# Patient Record
Sex: FEMALE | Race: WHITE | NOT HISPANIC OR LATINO | Employment: FULL TIME | URBAN - METROPOLITAN AREA
[De-identification: names, ages, dates, MRNs, and addresses within clinical notes are randomized per-mention and may not be internally consistent; named-entity substitution may affect disease eponyms.]

---

## 2018-02-02 ENCOUNTER — RECORDS - HEALTHEAST (OUTPATIENT)
Dept: LAB | Facility: CLINIC | Age: 53
End: 2018-02-02

## 2018-02-03 LAB — BACTERIA SPEC CULT: NO GROWTH

## 2018-07-23 ENCOUNTER — RECORDS - HEALTHEAST (OUTPATIENT)
Dept: LAB | Facility: CLINIC | Age: 53
End: 2018-07-23

## 2018-07-23 LAB
C DIFF TOX B STL QL: NEGATIVE
RIBOTYPE 027/NAP1/BI: NORMAL

## 2018-10-15 ENCOUNTER — RECORDS - HEALTHEAST (OUTPATIENT)
Dept: LAB | Facility: CLINIC | Age: 53
End: 2018-10-15

## 2018-10-16 LAB — BACTERIA SPEC CULT: NO GROWTH

## 2018-11-28 ENCOUNTER — RECORDS - HEALTHEAST (OUTPATIENT)
Dept: LAB | Facility: CLINIC | Age: 53
End: 2018-11-28

## 2018-11-29 LAB
ALBUMIN SERPL-MCNC: 4 G/DL (ref 3.5–5)
ANION GAP SERPL CALCULATED.3IONS-SCNC: 9 MMOL/L (ref 5–18)
BUN SERPL-MCNC: 16 MG/DL (ref 8–22)
CALCIUM SERPL-MCNC: 9.8 MG/DL (ref 8.5–10.5)
CHLORIDE BLD-SCNC: 109 MMOL/L (ref 98–107)
CO2 SERPL-SCNC: 25 MMOL/L (ref 22–31)
CREAT SERPL-MCNC: 0.73 MG/DL (ref 0.6–1.1)
GFR SERPL CREATININE-BSD FRML MDRD: >60 ML/MIN/1.73M2
GLUCOSE BLD-MCNC: 94 MG/DL (ref 70–125)
PHOSPHATE SERPL-MCNC: 3.4 MG/DL (ref 2.5–4.5)
POTASSIUM BLD-SCNC: 3.5 MMOL/L (ref 3.5–5)
SODIUM SERPL-SCNC: 143 MMOL/L (ref 136–145)
TSH SERPL DL<=0.005 MIU/L-ACNC: 4.1 UIU/ML (ref 0.3–5)

## 2019-02-20 ENCOUNTER — AMBULATORY - HEALTHEAST (OUTPATIENT)
Dept: CARDIOLOGY | Facility: CLINIC | Age: 54
End: 2019-02-20

## 2019-02-20 DIAGNOSIS — R07.9 CHEST PAIN: ICD-10-CM

## 2019-02-25 ENCOUNTER — HOSPITAL ENCOUNTER (OUTPATIENT)
Dept: CARDIOLOGY | Facility: CLINIC | Age: 54
Discharge: HOME | End: 2019-02-25
Attending: INTERNAL MEDICINE

## 2019-02-25 DIAGNOSIS — R07.9 CHEST PAIN: ICD-10-CM

## 2019-02-25 LAB
CV STRESS CURRENT BP HE: NORMAL
CV STRESS CURRENT HR HE: 101
CV STRESS CURRENT HR HE: 104
CV STRESS CURRENT HR HE: 107
CV STRESS CURRENT HR HE: 109
CV STRESS CURRENT HR HE: 109
CV STRESS CURRENT HR HE: 111
CV STRESS CURRENT HR HE: 111
CV STRESS CURRENT HR HE: 113
CV STRESS CURRENT HR HE: 124
CV STRESS CURRENT HR HE: 127
CV STRESS CURRENT HR HE: 127
CV STRESS CURRENT HR HE: 129
CV STRESS CURRENT HR HE: 131
CV STRESS CURRENT HR HE: 135
CV STRESS CURRENT HR HE: 137
CV STRESS CURRENT HR HE: 138
CV STRESS CURRENT HR HE: 141
CV STRESS CURRENT HR HE: 141
CV STRESS CURRENT HR HE: 144
CV STRESS CURRENT HR HE: 159
CV STRESS CURRENT HR HE: 159
CV STRESS CURRENT HR HE: 92
CV STRESS CURRENT HR HE: 96
CV STRESS CURRENT HR HE: 97
CV STRESS CURRENT HR HE: 98
CV STRESS CURRENT HR HE: 99
CV STRESS DEVIATION TIME HE: NORMAL
CV STRESS ECHO PERCENT HR HE: NORMAL
CV STRESS EXERCISE STAGE HE: NORMAL
CV STRESS FINAL RESTING BP HE: NORMAL
CV STRESS FINAL RESTING HR HE: 97
CV STRESS MAX HR HE: 160
CV STRESS MAX TREADMILL GRADE HE: 14
CV STRESS MAX TREADMILL SPEED HE: 3.4
CV STRESS PEAK DIA BP HE: NORMAL
CV STRESS PEAK SYS BP HE: NORMAL
CV STRESS PHASE HE: NORMAL
CV STRESS PROTOCOL HE: NORMAL
CV STRESS RESTING PT POSITION HE: NORMAL
CV STRESS ST DEVIATION AMOUNT HE: NORMAL
CV STRESS ST DEVIATION ELEVATION HE: NORMAL
CV STRESS ST EVELATION AMOUNT HE: NORMAL
CV STRESS TEST TYPE HE: NORMAL
CV STRESS TOTAL STAGE TIME MIN 1 HE: NORMAL
STRESS ECHO BASELINE BP: NORMAL
STRESS ECHO BASELINE HR: 82
STRESS ECHO CALCULATED PERCENT HR: 95 %
STRESS ECHO LAST STRESS BP: NORMAL
STRESS ECHO LAST STRESS HR: 159
STRESS ECHO POST ESTIMATED WORKLOAD: 10.1
STRESS ECHO POST EXERCISE DUR MIN: 8
STRESS ECHO POST EXERCISE DUR SEC: 26
STRESS ECHO TARGET HR: 144

## 2019-03-29 ENCOUNTER — RECORDS - HEALTHEAST (OUTPATIENT)
Dept: LAB | Facility: HOSPITAL | Age: 54
End: 2019-03-29

## 2019-03-29 ENCOUNTER — OFFICE VISIT - HEALTHEAST (OUTPATIENT)
Dept: CARDIOLOGY | Facility: CLINIC | Age: 54
End: 2019-03-29

## 2019-03-29 DIAGNOSIS — R07.89 OTHER CHEST PAIN: ICD-10-CM

## 2019-03-29 LAB — LEVETIRACETAM (KEPPRA): 26.6 UG/ML (ref 6–46)

## 2019-03-29 RX ORDER — B-COMPLEX WITH VITAMIN C
1000 TABLET ORAL DAILY
Status: SHIPPED | COMMUNITY
Start: 2019-03-29

## 2019-03-29 RX ORDER — METOPROLOL SUCCINATE 25 MG/1
25 TABLET, EXTENDED RELEASE ORAL DAILY
Refills: 4 | Status: SHIPPED | COMMUNITY
Start: 2019-02-22

## 2019-03-29 ASSESSMENT — MIFFLIN-ST. JEOR: SCORE: 1352.95

## 2019-05-24 ENCOUNTER — RECORDS - HEALTHEAST (OUTPATIENT)
Dept: LAB | Facility: CLINIC | Age: 54
End: 2019-05-24

## 2019-05-25 LAB — BACTERIA SPEC CULT: NO GROWTH

## 2019-09-13 ENCOUNTER — RECORDS - HEALTHEAST (OUTPATIENT)
Dept: LAB | Facility: CLINIC | Age: 54
End: 2019-09-13

## 2019-09-13 LAB
T4 FREE SERPL-MCNC: 1 NG/DL (ref 0.7–1.8)
TSH SERPL DL<=0.005 MIU/L-ACNC: 2.17 UIU/ML (ref 0.3–5)

## 2019-10-11 ENCOUNTER — RECORDS - HEALTHEAST (OUTPATIENT)
Dept: LAB | Facility: CLINIC | Age: 54
End: 2019-10-11

## 2019-10-12 LAB — BACTERIA SPEC CULT: NO GROWTH

## 2020-01-10 ENCOUNTER — RECORDS - HEALTHEAST (OUTPATIENT)
Dept: LAB | Facility: CLINIC | Age: 55
End: 2020-01-10

## 2020-01-10 LAB
ALBUMIN SERPL-MCNC: 3.9 G/DL (ref 3.5–5)
ALP SERPL-CCNC: 103 U/L (ref 45–120)
ALT SERPL W P-5'-P-CCNC: 19 U/L (ref 0–45)
ANION GAP SERPL CALCULATED.3IONS-SCNC: 9 MMOL/L (ref 5–18)
AST SERPL W P-5'-P-CCNC: 15 U/L (ref 0–40)
BILIRUB SERPL-MCNC: 0.5 MG/DL (ref 0–1)
BUN SERPL-MCNC: 17 MG/DL (ref 8–22)
CALCIUM SERPL-MCNC: 9.8 MG/DL (ref 8.5–10.5)
CHLORIDE BLD-SCNC: 107 MMOL/L (ref 98–107)
CO2 SERPL-SCNC: 25 MMOL/L (ref 22–31)
CREAT SERPL-MCNC: 0.75 MG/DL (ref 0.6–1.1)
GFR SERPL CREATININE-BSD FRML MDRD: >60 ML/MIN/1.73M2
GLUCOSE BLD-MCNC: 84 MG/DL (ref 70–125)
POTASSIUM BLD-SCNC: 4.3 MMOL/L (ref 3.5–5)
PROT SERPL-MCNC: 6.8 G/DL (ref 6–8)
SODIUM SERPL-SCNC: 141 MMOL/L (ref 136–145)

## 2020-01-11 LAB — BACTERIA SPEC CULT: NO GROWTH

## 2021-01-15 ENCOUNTER — RECORDS - HEALTHEAST (OUTPATIENT)
Dept: LAB | Facility: CLINIC | Age: 56
End: 2021-01-15

## 2021-01-16 LAB — BACTERIA SPEC CULT: NO GROWTH

## 2021-01-25 ENCOUNTER — RECORDS - HEALTHEAST (OUTPATIENT)
Dept: LAB | Facility: CLINIC | Age: 56
End: 2021-01-25

## 2021-01-25 ENCOUNTER — HOSPITAL ENCOUNTER (OUTPATIENT)
Dept: LAB | Age: 56
Setting detail: SPECIMEN
Discharge: HOME | End: 2021-01-25

## 2021-01-25 LAB
ALBUMIN SERPL-MCNC: 3.9 G/DL (ref 3.5–5)
ALP SERPL-CCNC: 100 U/L (ref 45–120)
ALT SERPL W P-5'-P-CCNC: 19 U/L (ref 0–45)
ANION GAP SERPL CALCULATED.3IONS-SCNC: 9 MMOL/L (ref 5–18)
AST SERPL W P-5'-P-CCNC: 16 U/L (ref 0–40)
BILIRUB SERPL-MCNC: 0.5 MG/DL (ref 0–1)
BUN SERPL-MCNC: 12 MG/DL (ref 8–22)
CALCIUM SERPL-MCNC: 9.4 MG/DL (ref 8.5–10.5)
CHLORIDE BLD-SCNC: 109 MMOL/L (ref 98–107)
CO2 SERPL-SCNC: 22 MMOL/L (ref 22–31)
CREAT SERPL-MCNC: 0.78 MG/DL (ref 0.6–1.1)
GFR SERPL CREATININE-BSD FRML MDRD: >60 ML/MIN/1.73M2
GLUCOSE BLD-MCNC: 88 MG/DL (ref 70–125)
POTASSIUM BLD-SCNC: 3.9 MMOL/L (ref 3.5–5)
PROT SERPL-MCNC: 6.8 G/DL (ref 6–8)
SODIUM SERPL-SCNC: 140 MMOL/L (ref 136–145)

## 2021-01-28 ENCOUNTER — RECORDS - HEALTHEAST (OUTPATIENT)
Dept: SCHEDULING | Facility: CLINIC | Age: 56
End: 2021-01-28

## 2021-01-28 ENCOUNTER — RECORDS - HEALTHEAST (OUTPATIENT)
Dept: ADMINISTRATIVE | Facility: OTHER | Age: 56
End: 2021-01-28

## 2021-01-28 DIAGNOSIS — Z12.31 VISIT FOR SCREENING MAMMOGRAM: ICD-10-CM

## 2021-02-05 ENCOUNTER — RECORDS - HEALTHEAST (OUTPATIENT)
Dept: LAB | Facility: CLINIC | Age: 56
End: 2021-02-05

## 2021-02-05 LAB — URATE SERPL-MCNC: 3.1 MG/DL (ref 2–7.5)

## 2021-05-27 ENCOUNTER — RECORDS - HEALTHEAST (OUTPATIENT)
Dept: ADMINISTRATIVE | Facility: CLINIC | Age: 56
End: 2021-05-27

## 2021-05-28 ENCOUNTER — RECORDS - HEALTHEAST (OUTPATIENT)
Dept: ADMINISTRATIVE | Facility: CLINIC | Age: 56
End: 2021-05-28

## 2021-05-29 ENCOUNTER — RECORDS - HEALTHEAST (OUTPATIENT)
Dept: ADMINISTRATIVE | Facility: CLINIC | Age: 56
End: 2021-05-29

## 2021-05-30 ENCOUNTER — RECORDS - HEALTHEAST (OUTPATIENT)
Dept: ADMINISTRATIVE | Facility: CLINIC | Age: 56
End: 2021-05-30

## 2021-06-01 ENCOUNTER — RECORDS - HEALTHEAST (OUTPATIENT)
Dept: ADMINISTRATIVE | Facility: CLINIC | Age: 56
End: 2021-06-01

## 2021-06-02 VITALS — BODY MASS INDEX: 22.32 KG/M2 | HEIGHT: 69 IN | WEIGHT: 150.7 LBS

## 2021-06-16 PROBLEM — R07.9 CHEST PAIN: Status: ACTIVE | Noted: 2019-02-18

## 2021-06-16 PROBLEM — R03.0 ELEVATED BLOOD PRESSURE READING WITHOUT DIAGNOSIS OF HYPERTENSION: Status: ACTIVE | Noted: 2019-02-18

## 2021-06-16 PROBLEM — F12.10 MILD TETRAHYDROCANNABINOL (THC) ABUSE: Status: ACTIVE | Noted: 2019-02-18

## 2021-06-16 PROBLEM — F33.9 EPISODE OF RECURRENT MAJOR DEPRESSIVE DISORDER (H): Status: ACTIVE | Noted: 2019-02-18

## 2021-06-16 PROBLEM — R56.9 NEW ONSET SEIZURE (H): Status: ACTIVE | Noted: 2019-02-18

## 2021-06-16 PROBLEM — G40.409 TONIC-CLONIC SEIZURES (H): Status: ACTIVE | Noted: 2019-02-18
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
## 2021-07-21 ENCOUNTER — RECORDS - HEALTHEAST (OUTPATIENT)
Dept: ADMINISTRATIVE | Facility: CLINIC | Age: 56
End: 2021-07-21

## 2021-08-11 ENCOUNTER — OFFICE VISIT (OUTPATIENT)
Dept: NEUROLOGY | Facility: CLINIC | Age: 56
End: 2021-08-11
Payer: COMMERCIAL

## 2021-08-11 VITALS
HEART RATE: 69 BPM | DIASTOLIC BLOOD PRESSURE: 99 MMHG | HEIGHT: 69 IN | WEIGHT: 179 LBS | BODY MASS INDEX: 26.51 KG/M2 | SYSTOLIC BLOOD PRESSURE: 157 MMHG

## 2021-08-11 DIAGNOSIS — R56.9 SINGLE SEIZURE (H): Primary | ICD-10-CM

## 2021-08-11 PROCEDURE — 99215 OFFICE O/P EST HI 40 MIN: CPT | Performed by: PSYCHIATRY & NEUROLOGY

## 2021-08-11 RX ORDER — ESCITALOPRAM OXALATE 20 MG/1
20 TABLET ORAL DAILY
COMMUNITY
Start: 2020-09-12

## 2021-08-11 ASSESSMENT — MIFFLIN-ST. JEOR: SCORE: 1481.32

## 2021-08-11 NOTE — LETTER
8/11/2021         RE: Seble Cardona  [address] [facility] [id number]        Dear Colleague,    Thank you for referring your patient, Seble Cardona, to the [facility] [place]. Please see a copy of my visit note below.    In person evaluation    HPI  5/22/2019, in person evaluation  8/11/2021, in person evaluation    55-year-old followed neurologically for:  Single seizure February 18, 2019  Significant sleep deprivation ( had left her went through a divorce)  Now doing much better    Patient was initially treated with Keppra  500 mg twice daily  Level was around 26 we reduce the dose  Keppra 250 mg in the morning 500 mg at night  Still felt too tired  About 6 months or so ago she stopped the medication completely  No seizures    We discussed using low-dose medication maybe at a lower dose  Discussed the fact that she had a normal MRI and a normal EEG and a single event  She has chosen to be off of medication at this time  If she has a second spell then we would have to restart medication  She might be able to get by with 250 mg twice daily  Or we could try a different antiepileptic medication    Since last seen about 2 years ago  No hospitalizations  No surgeries  No major illnesses  Did not get Covid  Did get the Covid vaccine Moderna, had some yucky feeling with both injections    Does have difficulty with left rotator cuff with calcification is going to have surgery August 26, 2021 with a arthroscopic procedure.  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    Is no longer having difficulty with sleep or sleep deprivation    Past neurologic history  Patient with single seizure February 18, 2019  Had left arm focal shaking numbness on that side then developed generalized tonic-clonic seizure  There is no déjà vu or taste changes at the time  No past seizures  prior to this  No history of head injury or meningitis or encephalitis  No history of family history of seizures    Past medical history  Seizure disorder onset 2019  Hypertension  Hypothyroidism   History of kidney stone    Habits  Does not smoke does not drink  Recent increased stressors around the time  but recently  works with emotionally disabled individuals      Family history  Father  at age 66  Mother  at 74 with heart disease and had Alzheimer's disease        Workup   MRI scan of the brain with and without contrast, normal  MRA intracranial vessels no significant stenosis  MRA of the neck vessels no significant stenosis or dissection  CT scan of the head normal  Echo 55% ejection fraction left atrium normal size  EEG read by Dr. Juan J Woodard  normal awake and drowsy and sleep stage I and II EEG  B12 271 relatively low normal  TSH 2.44   Urine tox screen positive for THC and opiates  EKG normal sinus rhythm  CTA chest PE run negative for pulmonary embolus, or dissection  Echo 55-60% ejection fraction, with exercise 70-75% ejection fraction, no cardiac ischemia  Keppra level 26.6, (2019 on 500 mg twice daily)                Review of Systems     Pertinent positives and negatives  No seizures  No loss of consciousness  No unusual spells    No headache no chest pain no shortness of breath no nausea vomiting no diarrhea no fever chills    No diplopia dysarthria dysphagia  No focal weakness numbness or tingling except below    Has decreased range of motion of left shoulder pain in the left shoulder calcification of the left shoulder tendons/joints    Sleep is good    Otherwise review of systems negative    General exam  Blood pressure 157/99, pulse 69  HEENT normal  Alert oriented x3  Lungs clear  Heart rate regular  Abdomen soft  Symmetrical pulses  No edema in the feet  Patient is left-handed  Left thumb slightly bigger than the right subtle      Alert oriented  x3  No aphasia  No neglect  Memory and recall normal    Cranial nerves II through XII normal  No ophthalmoplegia  No nystagmus  Visual fields intact  Face symmetrical  Tongue twisters good    Upper extremities  No drift no tremor normal finger-nose  Decreased range of motion of left shoulder    Lower extremities  Proximal distal strength good    Reflexes symmetrical    Gait normal  Romberg negative          Assessment/Plan     1.  B12 deficiency (E53.8)       Borderline B12 deficiency       will take oral supplement 500 mcg once per day        increased fatigue may be from this but also from sleep deprivation stress she will check with her primary about possible snoring and sleep apnea       2.  CTS (carpal tunnel syndrome) (G56.02)        Currently not a major problem will monitor       3.  Epilepsy (G40.209)        First-time seizure February 18, 2019       Secondary to sleep deprivation under still significant stress and sleep deprivation        Increased tiredness on 500 milligrams twice daily of Keppra Keppra level on the high dose is 26.6       Decrease Keppra to 250 mill grams in the morning 500 mg at night       Still had difficulty with Keppra stop medication completely 6 months ago             Single seizure       Normal MRI scan head       Normal EEG       Discussed pros and cons of treatment of single seizure versus no treatment with the above work-up       Patient chooses to be off of medication at this time       If she has any further spells or breakthrough seizures would restart Keppra at 250 mg twice daily    Discussed the above at length with patient  Driving form DMV form filled out  Single event okay to be off medication at this time  Single seizure triggered by significant event in her life which has now resolved        DMV form 8/11/2021  Last known seizure February 18, 2019  Under care since February 20, 2019  Date of diagnosis February 18, 2019  Off of medication due to single event triggered  by major event in her life.      Discussed epilepsy versus single seizure  Discussed current treatment recommendations and plan  We will follow-up at least on a yearly basis    42 minutes total care time today discussing and evaluating treatment for single seizure with the above work-up and patient's preferences.        Again, thank you for allowing me to participate in the care of your patient.        Sincerely,        Tyler Hilton MD

## 2021-08-11 NOTE — NURSING NOTE
Chief Complaint   Patient presents with     Seizures     Pt needing DMV form filled. Pt states she has not had any seizure activity.     Mai Arellano LPN on 8/11/2021 at 9:51 AM

## 2021-08-11 NOTE — PROGRESS NOTES
In person evaluation    HPI  5/22/2019, in person evaluation  8/11/2021, in person evaluation    55-year-old followed neurologically for:  Single seizure February 18, 2019  Significant sleep deprivation ( had left her went through a divorce)  Now doing much better    Patient was initially treated with Keppra  500 mg twice daily  Level was around 26 we reduce the dose  Keppra 250 mg in the morning 500 mg at night  Still felt too tired  About 6 months or so ago she stopped the medication completely  No seizures    We discussed using low-dose medication maybe at a lower dose  Discussed the fact that she had a normal MRI and a normal EEG and a single event  She has chosen to be off of medication at this time  If she has a second spell then we would have to restart medication  She might be able to get by with 250 mg twice daily  Or we could try a different antiepileptic medication    Since last seen about 2 years ago  No hospitalizations  No surgeries  No major illnesses  Did not get Covid  Did get the Covid vaccine Moderna, had some yucky feeling with both injections    Does have difficulty with left rotator cuff with calcification is going to have surgery August 26, 2021 with a arthroscopic procedure.  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    Is no longer having difficulty with sleep or sleep deprivation    Past neurologic history  Patient with single seizure February 18, 2019  Had left arm focal shaking numbness on that side then developed generalized tonic-clonic seizure  There is no déjà vu or taste changes at the time  No past seizures prior to this  No history of head injury or meningitis or encephalitis  No history of family history of seizures    Past medical history  Seizure disorder onset February 18, 2019  Hypertension  Hypothyroidism   History of kidney stone    Habits  Does not  smoke does not drink  Recent increased stressors around the time  but recently  works with emotionally disabled individuals      Family history  Father  at age 66  Mother  at 74 with heart disease and had Alzheimer's disease        Workup   MRI scan of the brain with and without contrast, normal  MRA intracranial vessels no significant stenosis  MRA of the neck vessels no significant stenosis or dissection  CT scan of the head normal  Echo 55% ejection fraction left atrium normal size  EEG read by Dr. Juan J Woodard  normal awake and drowsy and sleep stage I and II EEG  B12 271 relatively low normal  TSH 2.44   Urine tox screen positive for THC and opiates  EKG normal sinus rhythm  CTA chest PE run negative for pulmonary embolus, or dissection  Echo 55-60% ejection fraction, with exercise 70-75% ejection fraction, no cardiac ischemia  Keppra level 26.6, (2019 on 500 mg twice daily)                Review of Systems     Pertinent positives and negatives  No seizures  No loss of consciousness  No unusual spells    No headache no chest pain no shortness of breath no nausea vomiting no diarrhea no fever chills    No diplopia dysarthria dysphagia  No focal weakness numbness or tingling except below    Has decreased range of motion of left shoulder pain in the left shoulder calcification of the left shoulder tendons/joints    Sleep is good    Otherwise review of systems negative    General exam  Blood pressure 157/99, pulse 69  HEENT normal  Alert oriented x3  Lungs clear  Heart rate regular  Abdomen soft  Symmetrical pulses  No edema in the feet  Patient is left-handed  Left thumb slightly bigger than the right subtle      Alert oriented x3  No aphasia  No neglect  Memory and recall normal    Cranial nerves II through XII normal  No ophthalmoplegia  No nystagmus  Visual fields intact  Face symmetrical  Tongue twisters good    Upper extremities  No drift no tremor normal  finger-nose  Decreased range of motion of left shoulder    Lower extremities  Proximal distal strength good    Reflexes symmetrical    Gait normal  Romberg negative          Assessment/Plan     1.  B12 deficiency (E53.8)       Borderline B12 deficiency       will take oral supplement 500 mcg once per day        increased fatigue may be from this but also from sleep deprivation stress she will check with her primary about possible snoring and sleep apnea       2.  CTS (carpal tunnel syndrome) (G56.02)        Currently not a major problem will monitor       3.  Epilepsy (G40.209)        First-time seizure February 18, 2019       Secondary to sleep deprivation under still significant stress and sleep deprivation        Increased tiredness on 500 milligrams twice daily of Keppra Keppra level on the high dose is 26.6       Decrease Keppra to 250 mill grams in the morning 500 mg at night       Still had difficulty with Keppra stop medication completely 6 months ago             Single seizure       Normal MRI scan head       Normal EEG       Discussed pros and cons of treatment of single seizure versus no treatment with the above work-up       Patient chooses to be off of medication at this time       If she has any further spells or breakthrough seizures would restart Keppra at 250 mg twice daily    Discussed the above at length with patient  Driving form DMV form filled out  Single event okay to be off medication at this time  Single seizure triggered by significant event in her life which has now resolved        DMV form 8/11/2021  Last known seizure February 18, 2019  Under care since February 20, 2019  Date of diagnosis February 18, 2019  Off of medication due to single event triggered by major event in her life.      Discussed epilepsy versus single seizure  Discussed current treatment recommendations and plan  We will follow-up at least on a yearly basis    42 minutes total care time today discussing and evaluating  treatment for single seizure with the above work-up and patient's preferences.

## 2021-08-23 ENCOUNTER — LAB REQUISITION (OUTPATIENT)
Dept: LAB | Facility: CLINIC | Age: 56
End: 2021-08-23
Payer: COMMERCIAL

## 2021-08-23 DIAGNOSIS — Z01.812 ENCOUNTER FOR PREPROCEDURAL LABORATORY EXAMINATION: ICD-10-CM

## 2021-08-23 PROCEDURE — U0005 INFEC AGEN DETEC AMPLI PROBE: HCPCS | Mod: ORL | Performed by: PHYSICIAN ASSISTANT

## 2021-08-24 LAB — SARS-COV-2 RNA RESP QL NAA+PROBE: NEGATIVE

## 2022-03-22 ENCOUNTER — LAB REQUISITION (OUTPATIENT)
Dept: LAB | Facility: CLINIC | Age: 57
End: 2022-03-22

## 2022-03-22 DIAGNOSIS — J02.9 ACUTE PHARYNGITIS, UNSPECIFIED: ICD-10-CM

## 2022-03-22 PROCEDURE — 87081 CULTURE SCREEN ONLY: CPT | Performed by: FAMILY MEDICINE

## 2022-03-25 LAB — BACTERIA SPEC CULT: NORMAL

## 2022-09-19 ENCOUNTER — TELEPHONE (OUTPATIENT)
Dept: NEUROLOGY | Facility: CLINIC | Age: 57
End: 2022-09-19

## 2022-09-19 NOTE — TELEPHONE ENCOUNTER
[facility] Call Center    Phone Message    May a detailed message be left on voicemail: yes     Reason for Call: Other: Patient saw Dr. Hilton for seizure management last year. She has not had any seizures since last appt. He completed a DMV form for her. She needs to submit a new form by Nov 14 but next available appt with Dr. Hilton is in April. Please contact patient if she can be seen by Mariza.     Action Taken: Message routed to:  [facility] ([facility] neurology    Travel Screening: Not Applicable

## 2022-10-19 ENCOUNTER — LAB REQUISITION (OUTPATIENT)
Dept: LAB | Facility: CLINIC | Age: 57
End: 2022-10-19

## 2022-10-19 DIAGNOSIS — E03.9 HYPOTHYROIDISM, UNSPECIFIED: ICD-10-CM

## 2022-10-19 LAB — TSH SERPL DL<=0.005 MIU/L-ACNC: 7.99 UIU/ML (ref 0.3–4.2)

## 2022-10-19 PROCEDURE — 84443 ASSAY THYROID STIM HORMONE: CPT | Performed by: PHYSICIAN ASSISTANT

## 2022-10-19 PROCEDURE — 84439 ASSAY OF FREE THYROXINE: CPT | Performed by: PHYSICIAN ASSISTANT

## 2022-10-20 LAB — T4 FREE SERPL-MCNC: 1.36 NG/DL (ref 0.9–1.7)

## 2022-12-05 NOTE — PROGRESS NOTES
__________________________________  ESTABLISHED PATIENT NEUROLOGY NOTE    DATE OF VISIT: 12/7/2022  MRN: [id number]  PATIENT NAME: Seble Cardona  YOB: 1965    Chief Complaint   Patient presents with     Seizures     No concerns. Patient needs DMV form.     SUBJECTIVE:                                                      HISTORY OF PRESENT ILLNESS:  Seble is here for follow up regarding seizures    Seble Lamb is a 57 year old female with a history of hypertension, hypothyroidism, kidney stone and seizures. She follows with Dr. Hilton in the clinic, last seen on 08/11/21. Per chart review, patient had a single seizure on 02/18/19. She had left arm focal shaking numbness on that side then developed generalized tonic-clonic. Significant sleep deprivation ( had left her went through a divorce). she had a normal MRI and a normal EEG and a single event  She has chosen to be off of medication at this time.     Today 12/07/22  Selbe arrives today for her annual follow-up.  She reports that while going through a highly stressful time in her life, she was sleep deprived and went to the emergency department with focal shaking.  She ended up having a generalized tonic-clonic seizure in the emergency department.  She was started on Keppra which caused adverse effects of sleepiness.  After receiving a normal MRI and EEG she chose to stop her AED.  She has had 1 single seizure February 18, 2019.  She denies any further seizure activity.  No loss of consciousness, zoning out or starring spells, or involuntary muscles movement. Denies any periods of unexplained lost time.       Current Anti-Seizure Medications:    n/a     Psycho-Social History: Seble Cardona currently lives [place], with Fiance. Highest level of education associates degree. 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    Patient does smoke - 1/2 pack a day, rare alcohol use, no recreational drug  use.  Currently, patient denies feeling depressed, denies feeling anhedonia, denies suicidal  thoughts, and denies having feelings of excessive guilt/worthlessness.  We reviewed importance of mental and emotional wellbeing and impact on health.      Date of last seizure: 02/18/19  Driving:  Currently patient is:  Driving: Patient was made aware of state driving laws. Currently meets criteria to continue to drive.     Explained driving restrictions as per state law. No driving until approved by appropriate state licensing authorities. It is a licensed 's responsibility to be aware of and comply with laws and regulations regarding driving privileges and loss of awareness or alteration in ability to maintain control of a motor vehicle.   Current Medications:   albuterol (PROAIR HFA;PROVENTIL HFA;VENTOLIN HFA) 90 mcg/actuation inhaler, [ALBUTEROL (PROAIR HFA;PROVENTIL HFA;VENTOLIN HFA) 90 MCG/ACTUATION INHALER] Inhale 2 puffs every 6 (six) hours as needed for wheezing.  beclomethasone (QVAR) 80 mcg/actuation inhaler, Inhale 1 puff into the lungs 2 times daily  escitalopram (LEXAPRO) 20 MG tablet, Take 20 mg by mouth daily  fluticasone (FLONASE) 50 mcg/actuation nasal spray, [FLUTICASONE (FLONASE) 50 MCG/ACTUATION NASAL SPRAY] Apply 1 spray into each nostril at bedtime.  hydroCHLOROthiazide (HYDRODIURIL) 12.5 MG tablet, Take 12.5 mg by mouth daily  levothyroxine (SYNTHROID, LEVOTHROID) 137 MCG tablet, [LEVOTHYROXINE (SYNTHROID, LEVOTHROID) 137 MCG TABLET] Take 137 mcg by mouth Daily at 6:00 am.  metoprolol succinate (TOPROL-XL) 25 MG, [METOPROLOL SUCCINATE (TOPROL-XL) 25 MG] Take 25 mg by mouth daily.  montelukast (SINGULAIR) 10 mg tablet, [MONTELUKAST (SINGULAIR) 10 MG TABLET] Take 10 mg by mouth at bedtime.  multivitamin therapeutic tablet, [MULTIVITAMIN THERAPEUTIC TABLET] Take 1 tablet by mouth daily.  omega 3-dha-epa-fish oil (FISH OIL) 1,000 mg (120 mg-180 mg) cap, [OMEGA 3-DHA-EPA-FISH OIL (FISH OIL) 1,000  MG (120 MG-180 MG) CAP] Take 1,000 Units by mouth daily.  soy isofla/blk cohosh/mag bark (ESTROVEN ORAL), Take 1 capsule by mouth daily    No current facility-administered medications on file prior to visit.    Past Medical History:   Patient  has no past medical history on file.  Surgical History:  She  has a past surgical history that includes appendectomy; Cholecystectomy; and Hysterectomy.  Family and Social History:  Reviewed, and she  reports that she has been smoking. She has a 15.00 pack-year smoking history. She has never used smokeless tobacco. She reports that she does not drink alcohol and does not use drugs.  Reviewed, and family history includes Alzheimer Disease in her maternal grandmother, mother, and paternal grandmother; Heart Disease in her mother; Hyperlipidemia in her mother; Hypertension in her brother, mother, and sister.    RECENT DIAGNOSTIC STUDIES:    Imaging:   EXAM: MR BRAIN COW CAROTID W WO CONTRAST  LOCATION: [facility]  DATE/TIME: 2/19/2019 1:22 PM  CONCLUSION:  HEAD MRI:   1.  Normal head MRI for age.   2.  No acute infarct, mass, or hemorrhage.  3.  Near complete opacification right maxillary sinus with mild mucosal thickening left maxillary sinus.  HEAD MRA:   1.  Normal MRA [place] [facility] [place].  2.  No aneurysm, high flow AVM or significant stenosis identified.  3.  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 This is an anatomic variant.  NECK MRA:  1.  No significant stenosis in the neck vessels based on NASCET criteria.  2.  No evidence for dissection or pseudoaneurysm.  EXAM: CT HEAD WO CONTRAST  LOCATION: [facility]  DATE/TIME: 2/18/2019 9:09 PM  CONCLUSION:  No acute intracranial abnormality.    EEG 02/18/19  Impression: Normal voltage waking, drowsy, N1 and N2 sleep EEG recording.     REVIEW OF SYSTEMS:                                                      10-point review of  "systems is negative except as mentioned above in HPI.    EXAM:                                                      Physical Exam:   Vitals: BP (!) 158/105   Pulse 62   Ht 1.753 m (5' 9\")   Wt 81.6 kg (180 lb)   BMI 26.58 kg/m    BMI= Body mass index is 26.58 kg/m .  GENERAL: NAD.  HEENT: NC/AT.  PULM: Non-labored breathing.   Neurologic:  MENTAL STATUS: Alert, attentive. Speech is fluent. Normal comprehension. Normal concentration. Adequate fund of knowledge.   CRANIAL NERVES: Visual fields intact to confrontation. Pupils equally, round and reactive to light. Facial sensation and movement normal. EOM full. Hearing intact to conversation. Trapezius strength intact. Palate moves symmetrically. Tongue midline.  MOTOR: 5/5 in proximal and distal muscle groups of upper and lower extremities. Tone and bulk normal.   SENSATION: Normal light touch throughout.   STATION AND GAIT: Romberg Negative. Good postural reflexes. Casual gait and tandem Normal     ASSESSMENT and PLAN:                                                      Assessment:    ICD-10-CM    1. Single seizure (H)  R56.9         Seble Lamb is a 57 year old female with a history of hypertension, hypothyroidism, kidney stone and seizures. She follows with Dr. Hilton in the clinic, last seen on 08/11/21. Per chart review, patient had a single seizure on 02/18/19. She was on Keppra with increased fatigue. After receiving a normal MRI and EEG she decided to stop her AED and continue to monitor.   Patient has remained seizure free. I completed the DMV loss of consciousness form for a 2-year timeframe.  We discussed interventions, will plan to see the patient back in 12 months. Seble understands and agrees with this plan.     Plan:  --- CELEBRATE YOUR BIRTHDAY!!!!   --- DMV paperwork completed for 2 years  --- Plan on follow up in the Neurology Clinic in 12 months.  --- Please feel free to reach out if you have any further questions or concerns.  --- Seek " immediate medical attention if an emergency arises or if your health becomes progressively worse.     It was a pleasure to see you today! I hope you have a wonderful birthday!!     Total Time: Total time spent for face to face visit, reviewing labs/imaging studies, counseling and coordination of care was: 30 Minutes spent on the date of the encounter doing chart review, review of test results, patient visit and documentation       This note was dictated using voice recognition software.  Any grammatical or context distortions are unintentional and inherent to the software.    Mariza Lozada, DNP, APRN, CNP  [facility]

## 2022-12-07 ENCOUNTER — OFFICE VISIT (OUTPATIENT)
Dept: NEUROLOGY | Facility: CLINIC | Age: 57
End: 2022-12-07
Payer: COMMERCIAL

## 2022-12-07 VITALS
SYSTOLIC BLOOD PRESSURE: 158 MMHG | BODY MASS INDEX: 26.66 KG/M2 | HEART RATE: 62 BPM | HEIGHT: 69 IN | WEIGHT: 180 LBS | DIASTOLIC BLOOD PRESSURE: 105 MMHG

## 2022-12-07 DIAGNOSIS — R56.9 SINGLE SEIZURE (H): Primary | ICD-10-CM

## 2022-12-07 PROCEDURE — 99214 OFFICE O/P EST MOD 30 MIN: CPT

## 2022-12-07 NOTE — PATIENT INSTRUCTIONS
Plan:  --- CELEBRATE YOUR BIRTHDAY!!!!   --- DMV paperwork completed for 2 years  --- Plan on follow up in the Neurology Clinic in 12 months.  --- Please feel free to reach out if you have any further questions or concerns.  --- Seek immediate medical attention if an emergency arises or if your health becomes progressively worse.     It was a pleasure to see you today! I hope you have a wonderful birthday!!

## 2022-12-07 NOTE — NURSING NOTE
Chief Complaint   Patient presents with     Seizures     No concerns. Patient needs DMV form.     Irina Osborne on 12/7/2022 at 7:57 AM

## 2022-12-07 NOTE — LETTER
12/7/2022         RE: Seble Cardona  [address] [facility] [address]        Dear Colleague,    Thank you for referring your patient, Seble Cardona, to the [facility] [place]. Please see a copy of my visit note below.      __________________________________  ESTABLISHED PATIENT NEUROLOGY NOTE    DATE OF VISIT: 12/7/2022  MRN: [id number]  PATIENT NAME: Seble Cardona  YOB: 1965    Chief Complaint   Patient presents with     Seizures     No concerns. Patient needs DMV form.     SUBJECTIVE:                                                      HISTORY OF PRESENT ILLNESS:  Seble is here for follow up regarding seizures    Seble Lamb is a 57 year old female with a history of hypertension, hypothyroidism, kidney stone and seizures. She follows with Dr. Hilton in the clinic, last seen on 08/11/21. Per chart review, patient had a single seizure on 02/18/19. She had left arm focal shaking numbness on that side then developed generalized tonic-clonic. Significant sleep deprivation ( had left her went through a divorce). she had a normal MRI and a normal EEG and a single event  She has chosen to be off of medication at this time.     Today 12/07/22  Seble arrives today for her annual follow-up.  She reports that while going through a highly stressful time in her life, she was sleep deprived and went to the emergency department with focal shaking.  She ended up having a generalized tonic-clonic seizure in the emergency department.  She was started on Keppra which caused adverse effects of sleepiness.  After receiving a normal MRI and EEG she chose to stop her AED.  She has had 1 single seizure February 18, 2019.  She denies any further seizure activity.  No loss of consciousness, zoning out or starring spells, or involuntary muscles movement. Denies any periods of unexplained lost time.       Current Anti-Seizure Medications:    n/a     Psycho-Social History: Seble LYNCH  Brendan currently lives [place], with Fiance. Highest level of education associates degree. 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    Patient does smoke - 1/2 pack a day, rare alcohol use, no recreational drug use.  Currently, patient denies feeling depressed, denies feeling anhedonia, denies suicidal  thoughts, and denies having feelings of excessive guilt/worthlessness.  We reviewed importance of mental and emotional wellbeing and impact on health.      Date of last seizure: 02/18/19  Driving:  Currently patient is:  Driving: Patient was made aware of state driving laws. Currently meets criteria to continue to drive.     Explained driving restrictions as per state law. No driving until approved by appropriate state licensing authorities. It is a licensed 's responsibility to be aware of and comply with laws and regulations regarding driving privileges and loss of awareness or alteration in ability to maintain control of a motor vehicle.   Current Medications:   albuterol (PROAIR HFA;PROVENTIL HFA;VENTOLIN HFA) 90 mcg/actuation inhaler, [ALBUTEROL (PROAIR HFA;PROVENTIL HFA;VENTOLIN HFA) 90 MCG/ACTUATION INHALER] Inhale 2 puffs every 6 (six) hours as needed for wheezing.  beclomethasone (QVAR) 80 mcg/actuation inhaler, Inhale 1 puff into the lungs 2 times daily  escitalopram (LEXAPRO) 20 MG tablet, Take 20 mg by mouth daily  fluticasone (FLONASE) 50 mcg/actuation nasal spray, [FLUTICASONE (FLONASE) 50 MCG/ACTUATION NASAL SPRAY] Apply 1 spray into each nostril at bedtime.  hydroCHLOROthiazide (HYDRODIURIL) 12.5 MG tablet, Take 12.5 mg by mouth daily  levothyroxine (SYNTHROID, LEVOTHROID) 137 MCG tablet, [LEVOTHYROXINE (SYNTHROID, LEVOTHROID) 137 MCG TABLET] Take 137 mcg by mouth Daily at 6:00 am.  metoprolol succinate (TOPROL-XL) 25 MG, [METOPROLOL SUCCINATE (TOPROL-XL) 25 MG] Take 25 mg by mouth daily.  montelukast (SINGULAIR) 10 mg tablet, [MONTELUKAST (SINGULAIR) 10 MG  TABLET] Take 10 mg by mouth at bedtime.  multivitamin therapeutic tablet, [MULTIVITAMIN THERAPEUTIC TABLET] Take 1 tablet by mouth daily.  omega 3-dha-epa-fish oil (FISH OIL) 1,000 mg (120 mg-180 mg) cap, [OMEGA 3-DHA-EPA-FISH OIL (FISH OIL) 1,000 MG (120 MG-180 MG) CAP] Take 1,000 Units by mouth daily.  soy isofla/blk cohosh/mag bark (ESTROVEN ORAL), Take 1 capsule by mouth daily    No current facility-administered medications on file prior to visit.    Past Medical History:   Patient  has no past medical history on file.  Surgical History:  She  has a past surgical history that includes appendectomy; Cholecystectomy; and Hysterectomy.  Family and Social History:  Reviewed, and she  reports that she has been smoking. She has a 15.00 pack-year smoking history. She has never used smokeless tobacco. She reports that she does not drink alcohol and does not use drugs.  Reviewed, and family history includes Alzheimer Disease in her maternal grandmother, mother, and paternal grandmother; Heart Disease in her mother; Hyperlipidemia in her mother; Hypertension in her brother, mother, and sister.    RECENT DIAGNOSTIC STUDIES:    Imaging:   EXAM: MR BRAIN COW CAROTID W WO CONTRAST  LOCATION: [facility]  DATE/TIME: 2/19/2019 1:22 PM  CONCLUSION:  HEAD MRI:   1.  Normal head MRI for age.   2.  No acute infarct, mass, or hemorrhage.  3.  Near complete opacification right maxillary sinus with mild mucosal thickening left maxillary sinus.  HEAD MRA:   1.  Normal MRA [place] [facility] [place].  2.  No aneurysm, high flow AVM or significant stenosis identified.  3.  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 This is an anatomic variant.  NECK MRA:  1.  No significant stenosis in the neck vessels based on NASCET criteria.  2.  No evidence for dissection or pseudoaneurysm.  EXAM: CT HEAD WO CONTRAST  LOCATION: [facility]  DATE/TIME: 2/18/2019  "9:09 PM  CONCLUSION:  No acute intracranial abnormality.    EEG 02/18/19  Impression: Normal voltage waking, drowsy, N1 and N2 sleep EEG recording.     REVIEW OF SYSTEMS:                                                      10-point review of systems is negative except as mentioned above in HPI.    EXAM:                                                      Physical Exam:   Vitals: BP (!) 158/105   Pulse 62   Ht 1.753 m (5' 9\")   Wt 81.6 kg (180 lb)   BMI 26.58 kg/m    BMI= Body mass index is 26.58 kg/m .  GENERAL: NAD.  HEENT: NC/AT.  PULM: Non-labored breathing.   Neurologic:  MENTAL STATUS: Alert, attentive. Speech is fluent. Normal comprehension. Normal concentration. Adequate fund of knowledge.   CRANIAL NERVES: Visual fields intact to confrontation. Pupils equally, round and reactive to light. Facial sensation and movement normal. EOM full. Hearing intact to conversation. Trapezius strength intact. Palate moves symmetrically. Tongue midline.  MOTOR: 5/5 in proximal and distal muscle groups of upper and lower extremities. Tone and bulk normal.   SENSATION: Normal light touch throughout.   STATION AND GAIT: Romberg Negative. Good postural reflexes. Casual gait and tandem Normal     ASSESSMENT and PLAN:                                                      Assessment:    ICD-10-CM    1. Single seizure (H)  R56.9         Seble Lamb is a 57 year old female with a history of hypertension, hypothyroidism, kidney stone and seizures. She follows with Dr. Hilton in the clinic, last seen on 08/11/21. Per chart review, patient had a single seizure on 02/18/19. She was on Keppra with increased fatigue. After receiving a normal MRI and EEG she decided to stop her AED and continue to monitor.   Patient has remained seizure free. I completed the DMV loss of consciousness form for a 2-year timeframe.  We discussed interventions, will plan to see the patient back in 12 months. Seble understands and agrees with this plan. "     Plan:  --- CELEBRATE YOUR BIRTHDAY!!!!   --- DMV paperwork completed for 2 years  --- Plan on follow up in the Neurology Clinic in 12 months.  --- Please feel free to reach out if you have any further questions or concerns.  --- Seek immediate medical attention if an emergency arises or if your health becomes progressively worse.     It was a pleasure to see you today! I hope you have a wonderful birthday!!     Total Time: Total time spent for face to face visit, reviewing labs/imaging studies, counseling and coordination of care was: 30 Minutes spent on the date of the encounter doing chart review, review of test results, patient visit and documentation       This note was dictated using voice recognition software.  Any grammatical or context distortions are unintentional and inherent to the software.    Mariza Lozada, RAFAELA, APRN, CNP  [facility]                           Again, thank you for allowing me to participate in the care of your patient.        Sincerely,        JAIRO Small CNP

## 2022-12-29 ENCOUNTER — LAB REQUISITION (OUTPATIENT)
Dept: LAB | Facility: CLINIC | Age: 57
End: 2022-12-29

## 2022-12-29 DIAGNOSIS — R50.9 FEVER, UNSPECIFIED: ICD-10-CM

## 2022-12-29 PROCEDURE — 86140 C-REACTIVE PROTEIN: CPT | Performed by: PHYSICIAN ASSISTANT

## 2022-12-29 PROCEDURE — 80053 COMPREHEN METABOLIC PANEL: CPT | Performed by: PHYSICIAN ASSISTANT

## 2022-12-30 LAB
ALBUMIN SERPL BCG-MCNC: 4.1 G/DL (ref 3.5–5.2)
ALP SERPL-CCNC: 90 U/L (ref 35–104)
ALT SERPL W P-5'-P-CCNC: 27 U/L (ref 10–35)
ANION GAP SERPL CALCULATED.3IONS-SCNC: 13 MMOL/L (ref 7–15)
AST SERPL W P-5'-P-CCNC: 20 U/L (ref 10–35)
BILIRUB SERPL-MCNC: 0.2 MG/DL
BUN SERPL-MCNC: 15.5 MG/DL (ref 6–20)
CALCIUM SERPL-MCNC: 9.5 MG/DL (ref 8.6–10)
CHLORIDE SERPL-SCNC: 107 MMOL/L (ref 98–107)
CREAT SERPL-MCNC: 0.9 MG/DL (ref 0.51–0.95)
CRP SERPL-MCNC: <3 MG/L
DEPRECATED HCO3 PLAS-SCNC: 22 MMOL/L (ref 22–29)
GFR SERPL CREATININE-BSD FRML MDRD: 75 ML/MIN/1.73M2
GLUCOSE SERPL-MCNC: 104 MG/DL (ref 70–99)
POTASSIUM SERPL-SCNC: 4.2 MMOL/L (ref 3.4–5.3)
PROT SERPL-MCNC: 6.2 G/DL (ref 6.4–8.3)
SODIUM SERPL-SCNC: 142 MMOL/L (ref 136–145)

## 2023-01-17 ENCOUNTER — HOSPITAL ENCOUNTER (EMERGENCY)
Facility: CLINIC | Age: 58
Discharge: HOME | End: 2023-01-17
Attending: EMERGENCY MEDICINE | Admitting: EMERGENCY MEDICINE
Payer: COMMERCIAL

## 2023-01-17 VITALS
HEIGHT: 69 IN | HEART RATE: 86 BPM | RESPIRATION RATE: 16 BRPM | WEIGHT: 174 LBS | DIASTOLIC BLOOD PRESSURE: 73 MMHG | BODY MASS INDEX: 25.77 KG/M2 | TEMPERATURE: 99.2 F | OXYGEN SATURATION: 91 % | SYSTOLIC BLOOD PRESSURE: 146 MMHG

## 2023-01-17 DIAGNOSIS — U07.1 INFECTION DUE TO 2019 NOVEL CORONAVIRUS: ICD-10-CM

## 2023-01-17 LAB
ALBUMIN SERPL-MCNC: 3.9 G/DL (ref 3.5–5)
ALP SERPL-CCNC: 85 U/L (ref 45–120)
ALT SERPL W P-5'-P-CCNC: 29 U/L (ref 0–45)
ANION GAP SERPL CALCULATED.3IONS-SCNC: 9 MMOL/L (ref 5–18)
AST SERPL W P-5'-P-CCNC: 19 U/L (ref 0–40)
ATRIAL RATE - MUSE: 80 BPM
BASOPHILS # BLD AUTO: 0.1 10E3/UL (ref 0–0.2)
BASOPHILS NFR BLD AUTO: 1 %
BILIRUB SERPL-MCNC: 0.3 MG/DL (ref 0–1)
BUN SERPL-MCNC: 11 MG/DL (ref 8–22)
C REACTIVE PROTEIN LHE: 0.2 MG/DL (ref 0–?)
CALCIUM SERPL-MCNC: 9 MG/DL (ref 8.5–10.5)
CHLORIDE BLD-SCNC: 107 MMOL/L (ref 98–107)
CO2 SERPL-SCNC: 23 MMOL/L (ref 22–31)
CREAT SERPL-MCNC: 0.8 MG/DL (ref 0.6–1.1)
DIASTOLIC BLOOD PRESSURE - MUSE: 79 MMHG
EOSINOPHIL # BLD AUTO: 0.2 10E3/UL (ref 0–0.7)
EOSINOPHIL NFR BLD AUTO: 2 %
ERYTHROCYTE [DISTWIDTH] IN BLOOD BY AUTOMATED COUNT: 14 % (ref 10–15)
ERYTHROCYTE [SEDIMENTATION RATE] IN BLOOD BY WESTERGREN METHOD: 12 MM/HR (ref 0–20)
FLUAV RNA SPEC QL NAA+PROBE: NEGATIVE
FLUBV RNA RESP QL NAA+PROBE: NEGATIVE
GFR SERPL CREATININE-BSD FRML MDRD: 87 ML/MIN/1.73M2
GLUCOSE BLD-MCNC: 94 MG/DL (ref 70–125)
HCT VFR BLD AUTO: 40.6 % (ref 35–47)
HGB BLD-MCNC: 13.4 G/DL (ref 11.7–15.7)
HOLD SPECIMEN: NORMAL
HOLD SPECIMEN: NORMAL
IMM GRANULOCYTES # BLD: 0.1 10E3/UL
IMM GRANULOCYTES NFR BLD: 0 %
INTERPRETATION ECG - MUSE: NORMAL
LACTATE SERPL-SCNC: 1.8 MMOL/L (ref 0.7–2)
LYMPHOCYTES # BLD AUTO: 0.5 10E3/UL (ref 0.8–5.3)
LYMPHOCYTES NFR BLD AUTO: 4 %
MCH RBC QN AUTO: 29.7 PG (ref 26.5–33)
MCHC RBC AUTO-ENTMCNC: 33 G/DL (ref 31.5–36.5)
MCV RBC AUTO: 90 FL (ref 78–100)
MONOCYTES # BLD AUTO: 1.2 10E3/UL (ref 0–1.3)
MONOCYTES NFR BLD AUTO: 11 %
NEUTROPHILS # BLD AUTO: 9.3 10E3/UL (ref 1.6–8.3)
NEUTROPHILS NFR BLD AUTO: 82 %
NRBC # BLD AUTO: 0 10E3/UL
NRBC BLD AUTO-RTO: 0 /100
P AXIS - MUSE: 45 DEGREES
PLATELET # BLD AUTO: 379 10E3/UL (ref 150–450)
POTASSIUM BLD-SCNC: 3.7 MMOL/L (ref 3.5–5)
PR INTERVAL - MUSE: 160 MS
PROT SERPL-MCNC: 6.8 G/DL (ref 6–8)
QRS DURATION - MUSE: 94 MS
QT - MUSE: 384 MS
QTC - MUSE: 442 MS
R AXIS - MUSE: -42 DEGREES
RBC # BLD AUTO: 4.51 10E6/UL (ref 3.8–5.2)
RSV RNA SPEC NAA+PROBE: NEGATIVE
SARS-COV-2 RNA RESP QL NAA+PROBE: POSITIVE
SODIUM SERPL-SCNC: 139 MMOL/L (ref 136–145)
SYSTOLIC BLOOD PRESSURE - MUSE: 145 MMHG
T AXIS - MUSE: 56 DEGREES
VENTRICULAR RATE- MUSE: 80 BPM
WBC # BLD AUTO: 11.2 10E3/UL (ref 4–11)

## 2023-01-17 PROCEDURE — 83605 ASSAY OF LACTIC ACID: CPT | Performed by: EMERGENCY MEDICINE

## 2023-01-17 PROCEDURE — 36415 COLL VENOUS BLD VENIPUNCTURE: CPT | Performed by: EMERGENCY MEDICINE

## 2023-01-17 PROCEDURE — 96374 THER/PROPH/DIAG INJ IV PUSH: CPT

## 2023-01-17 PROCEDURE — 96361 HYDRATE IV INFUSION ADD-ON: CPT

## 2023-01-17 PROCEDURE — 80053 COMPREHEN METABOLIC PANEL: CPT | Performed by: EMERGENCY MEDICINE

## 2023-01-17 PROCEDURE — C9803 HOPD COVID-19 SPEC COLLECT: HCPCS

## 2023-01-17 PROCEDURE — 250N000011 HC RX IP 250 OP 636: Performed by: EMERGENCY MEDICINE

## 2023-01-17 PROCEDURE — 85652 RBC SED RATE AUTOMATED: CPT | Performed by: EMERGENCY MEDICINE

## 2023-01-17 PROCEDURE — 96375 TX/PRO/DX INJ NEW DRUG ADDON: CPT

## 2023-01-17 PROCEDURE — 86140 C-REACTIVE PROTEIN: CPT | Performed by: EMERGENCY MEDICINE

## 2023-01-17 PROCEDURE — 87637 SARSCOV2&INF A&B&RSV AMP PRB: CPT | Performed by: EMERGENCY MEDICINE

## 2023-01-17 PROCEDURE — 258N000003 HC RX IP 258 OP 636: Performed by: EMERGENCY MEDICINE

## 2023-01-17 PROCEDURE — 93005 ELECTROCARDIOGRAM TRACING: CPT | Performed by: EMERGENCY MEDICINE

## 2023-01-17 PROCEDURE — 99284 EMERGENCY DEPT VISIT MOD MDM: CPT | Mod: 25,CS

## 2023-01-17 PROCEDURE — 85025 COMPLETE CBC W/AUTO DIFF WBC: CPT | Performed by: EMERGENCY MEDICINE

## 2023-01-17 RX ORDER — ONDANSETRON 4 MG/1
4 TABLET, ORALLY DISINTEGRATING ORAL EVERY 8 HOURS PRN
Qty: 10 TABLET | Refills: 0 | Status: SHIPPED | OUTPATIENT
Start: 2023-01-17 | End: 2023-01-20

## 2023-01-17 RX ORDER — KETOROLAC TROMETHAMINE 15 MG/ML
15 INJECTION, SOLUTION INTRAMUSCULAR; INTRAVENOUS ONCE
Status: COMPLETED | OUTPATIENT
Start: 2023-01-17 | End: 2023-01-17

## 2023-01-17 RX ORDER — ONDANSETRON 2 MG/ML
4 INJECTION INTRAMUSCULAR; INTRAVENOUS ONCE
Status: COMPLETED | OUTPATIENT
Start: 2023-01-17 | End: 2023-01-17

## 2023-01-17 RX ADMIN — KETOROLAC TROMETHAMINE 15 MG: 15 INJECTION, SOLUTION INTRAMUSCULAR; INTRAVENOUS at 15:46

## 2023-01-17 RX ADMIN — ONDANSETRON 4 MG: 2 INJECTION INTRAMUSCULAR; INTRAVENOUS at 15:46

## 2023-01-17 RX ADMIN — SODIUM CHLORIDE 500 ML: 9 INJECTION, SOLUTION INTRAVENOUS at 15:43

## 2023-01-17 ASSESSMENT — ENCOUNTER SYMPTOMS
SHORTNESS OF BREATH: 0
ABDOMINAL PAIN: 0
CHILLS: 1
HEADACHES: 1
NAUSEA: 1

## 2023-01-17 ASSESSMENT — ACTIVITIES OF DAILY LIVING (ADL)
ADLS_ACUITY_SCORE: 35
ADLS_ACUITY_SCORE: 33

## 2023-01-17 NOTE — DISCHARGE INSTRUCTIONS
Work-up in the emergency department demonstrated a positive COVID-19 viral testing result.  I believe this is the source of your current symptomatology.  Take antiviral medication as prescribed.  Zofran to assist with her nausea.  Expect improvement to your symptoms over the next 3 to 5 days.  If you have escalating chest pain shortness of breath or escalating issues return to nearest emergency department for repeat assessment.

## 2023-01-17 NOTE — ED PROVIDER NOTES
EMERGENCY DEPARTMENT ENCOUNTER      NAME: Seble Cardona  AGE: 57 year old female  YOB: 1965  MRN: [id number]  EVALUATION DATE & TIME: 1/17/2023  2:29 PM    PCP: Anisa Zelaya    ED PROVIDER:  Marbin Fried MD    Chief Complaint   Patient presents with     Chills     Dizziness     FINAL IMPRESSION:  1. Infection due to 2019 novel coronavirus      ED COURSE & MEDICAL DECISION MAKING:    Pertinent Labs & Imaging studies reviewed. (See chart for details)  57 year old female presents to the Emergency Department for evaluation of body aches chills general malaise mild headache.  Symptom onset today.  On examination patient noted to be mildly hypertensive.  Temp 99.2.  Borderline O2 saturation on room air 91% on recheck it was normal.  Overall she did not appear to be short of breath and normal pulmonary examination with good breath sounds throughout.  The symptoms were relatively quick in onset developing approximately 11:00 PM today and progressing into the afternoon.  Remainder of her clinical examination was otherwise unremarkable.  Laboratory testing and COVID testing had been ordered prior to my involvement in the case and this demonstrated a positive acute COVID-19 testing result.  I do believe this is the acute source of patient's symptoms.  She was medicated with fluids Toradol and Zofran with significant improvement to her symptomatology.  Ultimately I feel patient is appropriate for discharge home and outpatient management.  Based on her age and risk factors patient was prescribed Paxlovid after pharmacy review for medication interactions.  In addition to that we will treat with symptomatic medications including Zofran close monitoring of symptoms and follow-up on outpatient basis.  Patient was comfortable this plan of care.       3:50 PM I met with the patient for an interview and initial exam. Plans for care were discussed.    At the conclusion of the encounter I discussed the results of  all of the tests and the disposition. The questions were answered. The patient or family acknowledged understanding and was agreeable with the care plan.    Medical Decision Making    History:    Supplemental history from: Documented in chart, if applicable    External Record(s) reviewed: Documented in chart, if applicable.    Work Up:    Chart documentation includes differential considered and any EKGs or imaging independently interpreted by provider, where specified.    In additional to work up documented, I considered the following work up: Documented in chart, if applicable.    External consultation:    Discussion of management with another provider: Documented in chart, if applicable    Complicating factors:    Care impacted by chronic illness: N/A    Care affected by social determinants of health: N/A    Disposition considerations: Discharge. I prescribed additional prescription strength medication(s) as charted. N/A.    MEDICATIONS GIVEN IN THE EMERGENCY:  Medications   0.9% sodium chloride BOLUS (0 mLs Intravenous Stopped 1/17/23 1658)   ondansetron (ZOFRAN) injection 4 mg (4 mg Intravenous Given 1/17/23 1546)   ketorolac (TORADOL) injection 15 mg (15 mg Intravenous Given 1/17/23 1546)       NEW PRESCRIPTIONS STARTED AT TODAY'S ER VISIT  Discharge Medication List as of 1/17/2023  4:58 PM      START taking these medications    Details   nirmatrelvir and ritonavir (PAXLOVID) therapy pack Take 3 tablets by mouth 2 times daily for 5 days, Disp-30 tablet, R-0, E-PrescribeDate of symptom onset: 1/17/23; Risk criteria met: Yes; Positive Covid-19 test: Yes; Weight >40 kg Yes; Renal fxn: normal;  Drug-Drug interactions reviewed & addressed: Jignesh s      ondansetron (ZOFRAN ODT) 4 MG ODT tab Take 1 tablet (4 mg) by mouth every 8 hours as needed for nausea, Disp-10 tablet, R-0, E-Prescribe                =================================================================    HPI    Patient information was obtained from:   Patient    Use of : N/A       Seble Cardona is a 57 year old female with a pertinent history of HTN , seizure who presents to this ED by private vehicle for evaluation of chills and dizziness.    Since 1100, patient had an onset of body aches, chills, mild headache, and nausea. Otherwise she denies experiencing symptoms of chest pain, sob, abdominal pain, and any other associated symptoms at this moment.    REVIEW OF SYSTEMS   Review of Systems   Constitutional: Positive for chills.        Positive for body aches   Respiratory: Negative for shortness of breath.    Cardiovascular: Negative for chest pain.   Gastrointestinal: Positive for nausea. Negative for abdominal pain.   Neurological: Positive for headaches ( mild    ).   All other systems reviewed and are negative.      PAST MEDICAL HISTORY:  Seizure  Hypertension  Obesity    PAST SURGICAL HISTORY:  Past Surgical History:   Procedure Laterality Date     APPENDECTOMY       CHOLECYSTECTOMY       HYSTERECTOMY             CURRENT MEDICATIONS:    nirmatrelvir and ritonavir (PAXLOVID) therapy pack  ondansetron (ZOFRAN ODT) 4 MG ODT tab  albuterol (PROAIR HFA;PROVENTIL HFA;VENTOLIN HFA) 90 mcg/actuation inhaler  beclomethasone (QVAR) 80 mcg/actuation inhaler  escitalopram (LEXAPRO) 20 MG tablet  fluticasone (FLONASE) 50 mcg/actuation nasal spray  hydroCHLOROthiazide (HYDRODIURIL) 12.5 MG tablet  levothyroxine (SYNTHROID, LEVOTHROID) 137 MCG tablet  metoprolol succinate (TOPROL-XL) 25 MG  montelukast (SINGULAIR) 10 mg tablet  multivitamin therapeutic tablet  omega 3-dha-epa-fish oil (FISH OIL) 1,000 mg (120 mg-180 mg) cap  soy isofla/blk cohosh/mag bark (ESTROVEN ORAL)        ALLERGIES:  Allergies   Allergen Reactions     Hydrocodone-Acetaminophen Unknown     Other reaction(s): Insomnia     Shellfish Containing Products [Shellfish-Derived Products] Hives       FAMILY HISTORY:  Family History   Problem Relation Age of Onset     Hypertension Mother       "Hyperlipidemia Mother      Alzheimer Disease Mother      Heart Disease Mother      Hypertension Sister      Hypertension Brother      Alzheimer Disease Maternal Grandmother      Alzheimer Disease Paternal Grandmother        SOCIAL HISTORY:   Social History     Socioeconomic History     Marital status:    Tobacco Use     Smoking status: Every Day     Packs/day: 0.50     Years: 30.00     Pack years: 15.00     Types: Cigarettes     Smokeless tobacco: Never   Substance and Sexual Activity     Alcohol use: No     Drug use: No       VITALS:  BP (!) 146/73   Pulse 86   Temp 99.2  F (37.3  C)   Resp 16   Ht 1.753 m (5' 9\")   Wt 78.9 kg (174 lb)   SpO2 91%   BMI 25.70 kg/m      PHYSICAL EXAM    PHYSICAL EXAM    Constitutional: Well developed, Well nourished, NAD  HENT: Normocephalic, Atraumatic, Bilateral external ears normal, Oropharynx normal, mucous membranes moist, Nose normal. Neck-  Normal range of motion, No tenderness, Supple, No stridor.   Eyes: PERRL, EOMI, Conjunctiva normal, No discharge.   Respiratory: Normal breath sounds, No respiratory distress, No wheezing, Speaks full sentences easily. No cough.   Cardiovascular: Normal heart rate, Regular rhythm, No murmurs Chest wall nontender.    GI:  Soft, No tenderness, No masses, No flank tenderness. No rebound or guarding.  : No cva tenderness    Musculoskeletal: 2+ DP pulses. No edema. No cyanosis. Good range of motion in all major joints. No tenderness to palpation. No tenderness of the CTLS spine.   Integument: Warm, Dry, No erythema, No rash. No petechiae.   Neurologic: Alert & oriented x 3, Normal motor function, Normal sensory function, No focal deficits noted.   Psychiatric: Affect normal, Judgment normal, Mood normal. Cooperative.      LAB:  All pertinent labs reviewed and interpreted.  Results for orders placed or performed during the hospital encounter of 01/17/23   Comprehensive metabolic panel   Result Value Ref Range    Sodium 139 136 - " 145 mmol/L    Potassium 3.7 3.5 - 5.0 mmol/L    Chloride 107 98 - 107 mmol/L    Carbon Dioxide (CO2) 23 22 - 31 mmol/L    Anion Gap 9 5 - 18 mmol/L    Urea Nitrogen 11 8 - 22 mg/dL    Creatinine 0.80 0.60 - 1.10 mg/dL    Calcium 9.0 8.5 - 10.5 mg/dL    Glucose 94 70 - 125 mg/dL    Alkaline Phosphatase 85 45 - 120 U/L    AST 19 0 - 40 U/L    ALT 29 0 - 45 U/L    Protein Total 6.8 6.0 - 8.0 g/dL    Albumin 3.9 3.5 - 5.0 g/dL    Bilirubin Total 0.3 0.0 - 1.0 mg/dL    GFR Estimate 87 >60 mL/min/1.73m2   Lactic acid whole blood   Result Value Ref Range    Lactic Acid 1.8 0.7 - 2.0 mmol/L   Symptomatic Influenza A/B & SARS-CoV2 (COVID-19) Virus PCR Multiplex Nasopharyngeal    Specimen: Nasopharyngeal; Swab   Result Value Ref Range    Influenza A PCR Negative Negative    Influenza B PCR Negative Negative    RSV PCR Negative Negative    SARS CoV2 PCR Positive (A) Negative   CRP inflammation   Result Value Ref Range    CRP 0.2 0.0 - <0.8 mg/dL   Erythrocyte sedimentation rate auto   Result Value Ref Range    Erythrocyte Sedimentation Rate 12 0 - 20 mm/hr   CBC with platelets and differential   Result Value Ref Range    WBC Count 11.2 (H) 4.0 - 11.0 10e3/uL    RBC Count 4.51 3.80 - 5.20 10e6/uL    Hemoglobin 13.4 11.7 - 15.7 g/dL    Hematocrit 40.6 35.0 - 47.0 %    MCV 90 78 - 100 fL    MCH 29.7 26.5 - 33.0 pg    MCHC 33.0 31.5 - 36.5 g/dL    RDW 14.0 10.0 - 15.0 %    Platelet Count 379 150 - 450 10e3/uL    % Neutrophils 82 %    % Lymphocytes 4 %    % Monocytes 11 %    % Eosinophils 2 %    % Basophils 1 %    % Immature Granulocytes 0 %    NRBCs per 100 WBC 0 <1 /100    Absolute Neutrophils 9.3 (H) 1.6 - 8.3 10e3/uL    Absolute Lymphocytes 0.5 (L) 0.8 - 5.3 10e3/uL    Absolute Monocytes 1.2 0.0 - 1.3 10e3/uL    Absolute Eosinophils 0.2 0.0 - 0.7 10e3/uL    Absolute Basophils 0.1 0.0 - 0.2 10e3/uL    Absolute Immature Granulocytes 0.1 <=0.4 10e3/uL    Absolute NRBCs 0.0 10e3/uL   Extra Blue Top Tube   Result Value Ref Range     Hold Specimen JIC    Extra Red Top Tube   Result Value Ref Range    Hold Specimen JIC    ECG 12-LEAD WITH MUSE (LHE)   Result Value Ref Range    Systolic Blood Pressure 145 mmHg    Diastolic Blood Pressure 79 mmHg    Ventricular Rate 80 BPM    Atrial Rate 80 BPM    IA Interval 160 ms    QRS Duration 94 ms     ms    QTc 442 ms    P Axis 45 degrees    R AXIS -42 degrees    T Axis 56 degrees    Interpretation ECG       Sinus rhythm  Left axis deviation  Incomplete right bundle branch block  Abnormal ECG  When compared with ECG of 19-FEB-2019 11:18,  Incomplete right bundle branch block is now Present  Confirmed by SEE ED PROVIDER NOTE FOR, ECG INTERPRETATION ([id number]),  Hossein Tyson ([id number]) on 1/17/2023 4:02:05 PM       EKG:    Performed at: 15:14  Impression: Sinus rhythm no acute changes appreciated  Rhythm: Sinus  Axis: 45 -42 56  IA Interval: 160  QRS Interval: 94  QTc Interval: 442  ST Changes: None  Comparison: 19-FEB-2019 11:18      I have independently reviewed and interpreted the EKG(s) documented above.    I, Perry Caba, am serving as a scribe to document services personally performed by Marbin Fried based on my observation and the provider's statements to me. I, Marbin Fried, attest that Perry Caba is acting in a scribe capacity, has observed my performance of the services and has documented them in accordance with my direction.    Marbin Fried MD  [facility] EMERGENCY ROOM  [address] [id number]  [phone]     Marbin Fried MD  01/17/23 [id number]

## 2023-01-17 NOTE — ED TRIAGE NOTES
The patient presents to the ED with chills, sudden onset of dizziness and bilateral leg weakness. She reports a headache and neck soreness as well. The patient reports these symptoms began around 11 am today. Denies cough or fever. Tested self at home for Covid, negative result.   Patient bundled in coat with large comforter at the time of triage.

## 2023-01-17 NOTE — PROGRESS NOTES
Pharmacist consulted to review drug interactions with Paxlovid    Utilized Covid-19 Drug Interactions, [facility] at [email or web address]    Referenced medication list and prescription fill history for past year; included ondansetron and dexamethasone for completeness, not suggesting they will be prescribed.     No clinically significant interactions expected.           Thank you, Livier Ashley RP 1/17/2023 4:35 PM

## 2023-05-04 ENCOUNTER — LAB REQUISITION (OUTPATIENT)
Dept: LAB | Facility: CLINIC | Age: 58
End: 2023-05-04

## 2023-05-04 DIAGNOSIS — E03.9 HYPOTHYROIDISM, UNSPECIFIED: ICD-10-CM

## 2023-05-04 LAB — TSH SERPL DL<=0.005 MIU/L-ACNC: 0.81 UIU/ML (ref 0.3–4.2)

## 2023-05-04 PROCEDURE — 84443 ASSAY THYROID STIM HORMONE: CPT | Performed by: PHYSICIAN ASSISTANT

## 2023-08-07 ENCOUNTER — LAB REQUISITION (OUTPATIENT)
Dept: LAB | Facility: CLINIC | Age: 58
End: 2023-08-07

## 2023-08-07 DIAGNOSIS — J02.9 ACUTE PHARYNGITIS, UNSPECIFIED: ICD-10-CM

## 2023-08-07 PROCEDURE — 87081 CULTURE SCREEN ONLY: CPT | Performed by: FAMILY MEDICINE

## 2023-08-10 LAB — BACTERIA SPEC CULT: NORMAL

## 2023-09-21 ENCOUNTER — LAB REQUISITION (OUTPATIENT)
Dept: LAB | Facility: CLINIC | Age: 58
End: 2023-09-21

## 2023-09-21 DIAGNOSIS — Z20.822 CONTACT WITH AND (SUSPECTED) EXPOSURE TO COVID-19: ICD-10-CM

## 2023-09-21 DIAGNOSIS — Z86.2 PERSONAL HISTORY OF DISEASES OF THE BLOOD AND BLOOD-FORMING ORGANS AND CERTAIN DISORDERS INVOLVING THE IMMUNE MECHANISM: ICD-10-CM

## 2023-09-21 LAB
ALBUMIN SERPL BCG-MCNC: 4.1 G/DL (ref 3.5–5.2)
ALP SERPL-CCNC: 98 U/L (ref 35–104)
ALT SERPL W P-5'-P-CCNC: 45 U/L (ref 0–50)
ANION GAP SERPL CALCULATED.3IONS-SCNC: 13 MMOL/L (ref 7–15)
AST SERPL W P-5'-P-CCNC: 30 U/L (ref 0–45)
BASOPHILS # BLD AUTO: 0.1 10E3/UL (ref 0–0.2)
BASOPHILS NFR BLD AUTO: 1 %
BILIRUB SERPL-MCNC: 0.4 MG/DL
BUN SERPL-MCNC: 10.8 MG/DL (ref 6–20)
CALCIUM SERPL-MCNC: 9.2 MG/DL (ref 8.6–10)
CHLORIDE SERPL-SCNC: 106 MMOL/L (ref 98–107)
CREAT SERPL-MCNC: 0.71 MG/DL (ref 0.51–0.95)
DEPRECATED HCO3 PLAS-SCNC: 21 MMOL/L (ref 22–29)
EGFRCR SERPLBLD CKD-EPI 2021: >90 ML/MIN/1.73M2
EOSINOPHIL # BLD AUTO: 0.2 10E3/UL (ref 0–0.7)
EOSINOPHIL NFR BLD AUTO: 2 %
ERYTHROCYTE [DISTWIDTH] IN BLOOD BY AUTOMATED COUNT: 14.6 % (ref 10–15)
GLUCOSE SERPL-MCNC: 104 MG/DL (ref 70–99)
HCT VFR BLD AUTO: 47.3 % (ref 35–47)
HGB BLD-MCNC: 15 G/DL (ref 11.7–15.7)
IMM GRANULOCYTES # BLD: 0.1 10E3/UL
IMM GRANULOCYTES NFR BLD: 1 %
LYMPHOCYTES # BLD AUTO: 3.3 10E3/UL (ref 0.8–5.3)
LYMPHOCYTES NFR BLD AUTO: 28 %
MCH RBC QN AUTO: 30.3 PG (ref 26.5–33)
MCHC RBC AUTO-ENTMCNC: 31.7 G/DL (ref 31.5–36.5)
MCV RBC AUTO: 96 FL (ref 78–100)
MONOCYTES # BLD AUTO: 0.8 10E3/UL (ref 0–1.3)
MONOCYTES NFR BLD AUTO: 7 %
NEUTROPHILS # BLD AUTO: 7.6 10E3/UL (ref 1.6–8.3)
NEUTROPHILS NFR BLD AUTO: 61 %
NRBC # BLD AUTO: 0 10E3/UL
NRBC BLD AUTO-RTO: 0 /100
PLATELET # BLD AUTO: 435 10E3/UL (ref 150–450)
POTASSIUM SERPL-SCNC: 3.6 MMOL/L (ref 3.4–5.3)
PROT SERPL-MCNC: 6.7 G/DL (ref 6.4–8.3)
RBC # BLD AUTO: 4.95 10E6/UL (ref 3.8–5.2)
SODIUM SERPL-SCNC: 140 MMOL/L (ref 136–145)
WBC # BLD AUTO: 12.1 10E3/UL (ref 4–11)

## 2023-09-21 PROCEDURE — 80053 COMPREHEN METABOLIC PANEL: CPT | Performed by: NURSE PRACTITIONER

## 2023-09-21 PROCEDURE — 85025 COMPLETE CBC W/AUTO DIFF WBC: CPT | Performed by: NURSE PRACTITIONER

## 2023-09-26 ENCOUNTER — LAB REQUISITION (OUTPATIENT)
Dept: LAB | Facility: CLINIC | Age: 58
End: 2023-09-26

## 2023-09-26 DIAGNOSIS — Z86.2 PERSONAL HISTORY OF DISEASES OF THE BLOOD AND BLOOD-FORMING ORGANS AND CERTAIN DISORDERS INVOLVING THE IMMUNE MECHANISM: ICD-10-CM

## 2023-09-26 LAB
BASOPHILS # BLD AUTO: 0.1 10E3/UL (ref 0–0.2)
BASOPHILS NFR BLD AUTO: 1 %
EOSINOPHIL # BLD AUTO: 0.2 10E3/UL (ref 0–0.7)
EOSINOPHIL NFR BLD AUTO: 2 %
ERYTHROCYTE [DISTWIDTH] IN BLOOD BY AUTOMATED COUNT: 14.5 % (ref 10–15)
HCT VFR BLD AUTO: 47.2 % (ref 35–47)
HGB BLD-MCNC: 15 G/DL (ref 11.7–15.7)
IMM GRANULOCYTES # BLD: 0.1 10E3/UL
IMM GRANULOCYTES NFR BLD: 0 %
LYMPHOCYTES # BLD AUTO: 2.2 10E3/UL (ref 0.8–5.3)
LYMPHOCYTES NFR BLD AUTO: 19 %
MCH RBC QN AUTO: 30.5 PG (ref 26.5–33)
MCHC RBC AUTO-ENTMCNC: 31.8 G/DL (ref 31.5–36.5)
MCV RBC AUTO: 96 FL (ref 78–100)
MONOCYTES # BLD AUTO: 0.8 10E3/UL (ref 0–1.3)
MONOCYTES NFR BLD AUTO: 7 %
NEUTROPHILS # BLD AUTO: 8.3 10E3/UL (ref 1.6–8.3)
NEUTROPHILS NFR BLD AUTO: 71 %
NRBC # BLD AUTO: 0 10E3/UL
NRBC BLD AUTO-RTO: 0 /100
PLATELET # BLD AUTO: 415 10E3/UL (ref 150–450)
RBC # BLD AUTO: 4.92 10E6/UL (ref 3.8–5.2)
RETICS # AUTO: 0.07 10E6/UL (ref 0.03–0.1)
RETICS/RBC NFR AUTO: 1.4 % (ref 0.5–2)
WBC # BLD AUTO: 11.6 10E3/UL (ref 4–11)

## 2023-09-26 PROCEDURE — 85018 HEMOGLOBIN: CPT | Performed by: NURSE PRACTITIONER

## 2023-09-26 PROCEDURE — 85060 BLOOD SMEAR INTERPRETATION: CPT | Performed by: PATHOLOGY

## 2023-09-26 PROCEDURE — 85045 AUTOMATED RETICULOCYTE COUNT: CPT | Performed by: NURSE PRACTITIONER

## 2023-09-27 LAB
PATH REPORT.COMMENTS IMP SPEC: NORMAL
PATH REPORT.COMMENTS IMP SPEC: NORMAL
PATH REPORT.FINAL DX SPEC: NORMAL
PATH REPORT.RELEVANT HX SPEC: NORMAL

## 2024-02-05 ENCOUNTER — LAB REQUISITION (OUTPATIENT)
Dept: LAB | Facility: CLINIC | Age: 59
End: 2024-02-05

## 2024-02-05 DIAGNOSIS — J02.9 ACUTE PHARYNGITIS, UNSPECIFIED: ICD-10-CM

## 2024-02-05 PROCEDURE — 87081 CULTURE SCREEN ONLY: CPT | Performed by: PHYSICIAN ASSISTANT

## 2024-02-07 LAB — BACTERIA SPEC CULT: NORMAL

## 2024-06-20 ENCOUNTER — LAB REQUISITION (OUTPATIENT)
Dept: LAB | Facility: CLINIC | Age: 59
End: 2024-06-20

## 2024-06-20 DIAGNOSIS — I10 ESSENTIAL (PRIMARY) HYPERTENSION: ICD-10-CM

## 2024-06-20 DIAGNOSIS — E03.9 HYPOTHYROIDISM, UNSPECIFIED: ICD-10-CM

## 2024-06-20 PROCEDURE — 84443 ASSAY THYROID STIM HORMONE: CPT | Performed by: PHYSICIAN ASSISTANT

## 2024-06-20 PROCEDURE — 80053 COMPREHEN METABOLIC PANEL: CPT | Performed by: PHYSICIAN ASSISTANT

## 2024-06-20 PROCEDURE — 84439 ASSAY OF FREE THYROXINE: CPT | Performed by: PHYSICIAN ASSISTANT

## 2024-06-21 LAB
ALBUMIN SERPL BCG-MCNC: 4.1 G/DL (ref 3.5–5.2)
ALP SERPL-CCNC: 97 U/L (ref 40–150)
ALT SERPL W P-5'-P-CCNC: 22 U/L (ref 0–50)
ANION GAP SERPL CALCULATED.3IONS-SCNC: 11 MMOL/L (ref 7–15)
AST SERPL W P-5'-P-CCNC: 18 U/L (ref 0–45)
BILIRUB SERPL-MCNC: 0.3 MG/DL
BUN SERPL-MCNC: 13.8 MG/DL (ref 6–20)
CALCIUM SERPL-MCNC: 9.5 MG/DL (ref 8.6–10)
CHLORIDE SERPL-SCNC: 105 MMOL/L (ref 98–107)
CREAT SERPL-MCNC: 0.72 MG/DL (ref 0.51–0.95)
DEPRECATED HCO3 PLAS-SCNC: 23 MMOL/L (ref 22–29)
EGFRCR SERPLBLD CKD-EPI 2021: >90 ML/MIN/1.73M2
GLUCOSE SERPL-MCNC: 134 MG/DL (ref 70–99)
POTASSIUM SERPL-SCNC: 4 MMOL/L (ref 3.4–5.3)
PROT SERPL-MCNC: 6.9 G/DL (ref 6.4–8.3)
SODIUM SERPL-SCNC: 139 MMOL/L (ref 135–145)
T4 FREE SERPL-MCNC: 1.42 NG/DL (ref 0.9–1.7)
TSH SERPL DL<=0.005 MIU/L-ACNC: 5.13 UIU/ML (ref 0.3–4.2)

## 2025-02-11 NOTE — PROGRESS NOTES
__________________________________  ESTABLISHED PATIENT NEUROLOGY NOTE    DATE OF VISIT: 12/7/2022  MRN: [id number]  PATIENT NAME: Seble Cardona  YOB: 1965    Chief Complaint   Patient presents with    Seizures     Patient states her last seizure was in 2019. She would like her [facility] paperwork. Follow up     SUBJECTIVE:                                                      HISTORY OF PRESENT ILLNESS:  Seble is here for follow up regarding seizures    Seble Lamb is a 59 year old female with a history of hypertension, hypothyroidism, kidney stone and seizures. She follows with Dr. Hilton in the clinic, last seen on 08/11/21. Per chart review, patient had a single seizure on 02/18/19. She had left arm focal shaking numbness on that side then developed generalized tonic-clonic. Significant sleep deprivation ( had left her went through a divorce). she had a normal MRI and a normal EEG and a single event. She has chosen to be off of medication at this time.     12/07/22: Seble arrives today for her annual follow-up.  She reports that while going through a highly stressful time in her life, she was sleep deprived and went to the emergency department with focal shaking.  She ended up having a generalized tonic-clonic seizure in the emergency department.  She was started on Keppra which caused adverse effects of sleepiness.  After receiving a normal MRI and EEG she chose to stop her AED.  She has had 1 single seizure February 18, 2019.  She denies any further seizure activity.  No loss of consciousness, zoning out or starring spells, or involuntary muscles movement. Denies any periods of unexplained lost time.    02/12/25: Seble presents to the clinic today for her annual seizure follow-up.  Seble confirms that she had 1 single seizure episode on 2/18/2019 in the emergency department.  She was sleep deprived undergoing a high stressful situation.  Patient has not been on an antiseizure medication  since 2019 and has remained seizure-free.  She denies loss of consciousness, zoning out or staring spells.  No involuntary muscle movement.  She is doing well with no additional concerns.  Her license has recently been suspended and she would like her [facility] paperwork completed.     Current Anti-Seizure Medications:    n/a     Psycho-Social History: Seble Cardona currently lives [place], with Fiance. Highest level of education associates degree. Employment status: a 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    Patient does smoke - 1/2 pack a day, rare alcohol use, no recreational drug use.  Currently, patient denies feeling depressed, denies feeling anhedonia, denies suicidal  thoughts, and denies having feelings of excessive guilt/worthlessness.  We reviewed importance of mental and emotional wellbeing and impact on health.      Date of last seizure: 02/18/19  Driving:  Currently patient is:  Driving: Patient was made aware of state driving laws. Currently meets criteria to continue to drive.     Explained driving restrictions as per state law. No driving until approved by appropriate state licensing authorities. It is a licensed 's responsibility to be aware of and comply with laws and regulations regarding driving privileges and loss of awareness or alteration in ability to maintain control of a motor vehicle.   Current Medications:   Current Outpatient Medications   Medication Sig Dispense Refill    albuterol (PROAIR HFA;PROVENTIL HFA;VENTOLIN HFA) 90 mcg/actuation inhaler [ALBUTEROL (PROAIR HFA;PROVENTIL HFA;VENTOLIN HFA) 90 MCG/ACTUATION INHALER] Inhale 2 puffs every 6 (six) hours as needed for wheezing.      beclomethasone (QVAR) 80 mcg/actuation inhaler Inhale 1 puff into the lungs 2 times daily      escitalopram (LEXAPRO) 20 MG tablet Take 20 mg by mouth daily      fluticasone (FLONASE) 50 mcg/actuation nasal spray [FLUTICASONE (FLONASE) 50 MCG/ACTUATION NASAL SPRAY] Apply 1 spray into each  nostril at bedtime.      hydroCHLOROthiazide (HYDRODIURIL) 12.5 MG tablet Take 12.5 mg by mouth daily      levothyroxine (SYNTHROID, LEVOTHROID) 137 MCG tablet [LEVOTHYROXINE (SYNTHROID, LEVOTHROID) 137 MCG TABLET] Take 137 mcg by mouth Daily at 6:00 am.      metoprolol succinate (TOPROL-XL) 25 MG [METOPROLOL SUCCINATE (TOPROL-XL) 25 MG] Take 25 mg by mouth daily.  4    montelukast (SINGULAIR) 10 mg tablet [MONTELUKAST (SINGULAIR) 10 MG TABLET] Take 10 mg by mouth at bedtime.      multivitamin therapeutic tablet [MULTIVITAMIN THERAPEUTIC TABLET] Take 1 tablet by mouth daily.      omega 3-dha-epa-fish oil (FISH OIL) 1,000 mg (120 mg-180 mg) cap [OMEGA 3-DHA-EPA-FISH OIL (FISH OIL) 1,000 MG (120 MG-180 MG) CAP] Take 1,000 Units by mouth daily.      soy isofla/blk cohosh/mag bark (ESTROVEN ORAL) Take 1 capsule by mouth daily       No current facility-administered medications for this visit.     Past Medical History:   Patient  has no past medical history on file.  Surgical History:  She  has a past surgical history that includes appendectomy; Cholecystectomy; and Hysterectomy.  Family and Social History:  Reviewed, and she  reports that she has been smoking. She has a 15 pack-year smoking history. She has never used smokeless tobacco. She reports that she does not drink alcohol and does not use drugs.  Reviewed, and family history includes Alzheimer Disease in her maternal grandmother, mother, and paternal grandmother; Heart Disease in her mother; Hyperlipidemia in her mother; Hypertension in her brother, mother, and sister.    RECENT DIAGNOSTIC STUDIES:    Imaging:   EXAM: MR BRAIN COW CAROTID W WO CONTRAST  LOCATION: [facility]  DATE/TIME: 2/19/2019 1:22 PM  CONCLUSION:  HEAD MRI:   1.  Normal head MRI for age.   2.  No acute infarct, mass, or hemorrhage.  3.  Near complete opacification right maxillary sinus with mild mucosal thickening left maxillary sinus.  HEAD MRA:   1.  Normal MRA [place] [facility] [place].  2.  " No aneurysm, high flow AVM or significant stenosis identified.  3.  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 This is an anatomic variant.  NECK MRA:  1.  No significant stenosis in the neck vessels based on NASCET criteria.  2.  No evidence for dissection or pseudoaneurysm.  EXAM: CT HEAD WO CONTRAST  LOCATION: [facility]  DATE/TIME: 2/18/2019 9:09 PM  CONCLUSION:  No acute intracranial abnormality.    EEG 02/18/19  Impression: Normal voltage waking, drowsy, N1 and N2 sleep EEG recording.     REVIEW OF SYSTEMS:                                                      10-point review of systems is negative except as mentioned above in HPI.    EXAM:                                                      Physical Exam:   Vitals: BP (!) 190/113   Pulse 68   Ht 1.753 m (5' 9\")   Wt 79.4 kg (175 lb)   BMI 25.84 kg/m    BMI= Body mass index is 25.84 kg/m .  GENERAL: NAD.  HEENT: NC/AT.  PULM: Non-labored breathing.   Neurologic:  MENTAL STATUS: Alert, attentive. Speech is fluent. Normal comprehension. Normal concentration. Adequate fund of knowledge.   CRANIAL NERVES: Visual fields intact to confrontation. Pupils equally, round and reactive to light. Facial sensation and movement normal. EOM full. Hearing intact to conversation. Trapezius strength intact. Palate moves symmetrically. Tongue midline.  MOTOR: 5/5 in proximal and distal muscle groups of upper and lower extremities. Tone and bulk normal.   SENSATION: Normal light touch throughout.   STATION AND GAIT: Romberg Negative. Good postural reflexes. Casual gait      ASSESSMENT and PLAN:                                                      Assessment:    ICD-10-CM    1. Tonic-clonic seizures (H)  G40.409           Seble Lamb is a 59 year old female with a history of hypertension, hypothyroidism, kidney stone and seizures. She follows with Dr. Hilton in the clinic. Per chart " review, patient had a single seizure on 02/18/19. She was on Keppra with increased fatigue. After receiving a normal MRI and EEG she decided to stop her AED and continue to monitor.  Patient has remained seizure free. I completed the DMV loss of consciousness form for a 3-year timeframe.  We discussed interventions, will plan to see the patient back in 12 months. Seble understands and agrees with this plan.     Plan:  --- DMV paperwork completed for 3 years  --- Plan on follow up in the Neurology Clinic in 12 months.  --- Please feel free to reach out if you have any further questions or concerns.  --- Seek immediate medical attention if an emergency arises or if your health becomes progressively worse.     It was a pleasure to see you today!     Total Time: Total time spent for face to face visit, reviewing labs/imaging studies, counseling and coordination of care was: 15 Minutes spent on the date of the encounter doing chart review, review of test results, patient visit and documentation     This note was dictated using voice recognition software.  Any grammatical or context distortions are unintentional and inherent to the software.    Mariza Lozada, DNP, APRN, CNP  [facility]

## 2025-02-12 ENCOUNTER — OFFICE VISIT (OUTPATIENT)
Dept: NEUROLOGY | Facility: CLINIC | Age: 60
End: 2025-02-12
Payer: COMMERCIAL

## 2025-02-12 VITALS
BODY MASS INDEX: 25.92 KG/M2 | HEIGHT: 69 IN | SYSTOLIC BLOOD PRESSURE: 189 MMHG | WEIGHT: 175 LBS | DIASTOLIC BLOOD PRESSURE: 123 MMHG | HEART RATE: 70 BPM

## 2025-02-12 DIAGNOSIS — G40.409 TONIC-CLONIC SEIZURES (H): Primary | ICD-10-CM

## 2025-02-12 NOTE — NURSING NOTE
Chief Complaint   Patient presents with    Seizures     Patient states her last seizure was in 2019. She would like her V paperwork. Follow up     Irina Osborne on 2/12/2025 at 11:27 AM

## 2025-02-12 NOTE — LETTER
2/12/2025      Seble Cardona  [address] [place] [id number]      Dear Colleague,    Thank you for referring your patient, Seble Cardona, to the [facility] [place]. Please see a copy of my visit note below.      __________________________________  ESTABLISHED PATIENT NEUROLOGY NOTE    DATE OF VISIT: 12/7/2022  MRN: [id number]  PATIENT NAME: Seble Cardona  YOB: 1965    Chief Complaint   Patient presents with     Seizures     Patient states her last seizure was in 2019. She would like her V paperwork. Follow up     SUBJECTIVE:                                                      HISTORY OF PRESENT ILLNESS:  Seble is here for follow up regarding seizures    Seble Lamb is a 59 year old female with a history of hypertension, hypothyroidism, kidney stone and seizures. She follows with Dr. Hilton in the clinic, last seen on 08/11/21. Per chart review, patient had a single seizure on 02/18/19. She had left arm focal shaking numbness on that side then developed generalized tonic-clonic. Significant sleep deprivation ( had left her went through a divorce). she had a normal MRI and a normal EEG and a single event. She has chosen to be off of medication at this time.     12/07/22: Seble arrives today for her annual follow-up.  She reports that while going through a highly stressful time in her life, she was sleep deprived and went to the emergency department with focal shaking.  She ended up having a generalized tonic-clonic seizure in the emergency department.  She was started on Keppra which caused adverse effects of sleepiness.  After receiving a normal MRI and EEG she chose to stop her AED.  She has had 1 single seizure February 18, 2019.  She denies any further seizure activity.  No loss of consciousness, zoning out or starring spells, or involuntary muscles movement. Denies any periods of unexplained lost time.    02/12/25: Seble presents to the clinic today  for her annual seizure follow-up.  Seble confirms that she had 1 single seizure episode on 2/18/2019 in the emergency department.  She was sleep deprived undergoing a high stressful situation.  Patient has not been on an antiseizure medication since 2019 and has remained seizure-free.  She denies loss of consciousness, zoning out or staring spells.  No involuntary muscle movement.  She is doing well with no additional concerns.  Her license has recently been suspended and she would like her [employer] paperwork completed.     Current Anti-Seizure Medications:    n/a     Psycho-Social History: Seble Cardona currently lives [place], with Fiance. Highest level of education associates degree. 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    Patient does smoke - 1/2 pack a day, rare alcohol use, no recreational drug use.  Currently, patient denies feeling depressed, denies feeling anhedonia, denies suicidal  thoughts, and denies having feelings of excessive guilt/worthlessness.  We reviewed importance of mental and emotional wellbeing and impact on health.      Date of last seizure: 02/18/19  Driving:  Currently patient is:  Driving: Patient was made aware of state driving laws. Currently meets criteria to continue to drive.     Explained driving restrictions as per state law. No driving until approved by appropriate state licensing authorities. It is a licensed 's responsibility to be aware of and comply with laws and regulations regarding driving privileges and loss of awareness or alteration in ability to maintain control of a motor vehicle.   Current Medications:   Current Outpatient Medications   Medication Sig Dispense Refill     albuterol (PROAIR HFA;PROVENTIL HFA;VENTOLIN HFA) 90 mcg/actuation inhaler [ALBUTEROL (PROAIR HFA;PROVENTIL HFA;VENTOLIN HFA) 90 MCG/ACTUATION INHALER] Inhale 2 puffs every 6 (six) hours as needed for wheezing.       beclomethasone (QVAR) 80 mcg/actuation  inhaler Inhale 1 puff into the lungs 2 times daily       escitalopram (LEXAPRO) 20 MG tablet Take 20 mg by mouth daily       fluticasone (FLONASE) 50 mcg/actuation nasal spray [FLUTICASONE (FLONASE) 50 MCG/ACTUATION NASAL SPRAY] Apply 1 spray into each nostril at bedtime.       hydroCHLOROthiazide (HYDRODIURIL) 12.5 MG tablet Take 12.5 mg by mouth daily       levothyroxine (SYNTHROID, LEVOTHROID) 137 MCG tablet [LEVOTHYROXINE (SYNTHROID, LEVOTHROID) 137 MCG TABLET] Take 137 mcg by mouth Daily at 6:00 am.       metoprolol succinate (TOPROL-XL) 25 MG [METOPROLOL SUCCINATE (TOPROL-XL) 25 MG] Take 25 mg by mouth daily.  4     montelukast (SINGULAIR) 10 mg tablet [MONTELUKAST (SINGULAIR) 10 MG TABLET] Take 10 mg by mouth at bedtime.       multivitamin therapeutic tablet [MULTIVITAMIN THERAPEUTIC TABLET] Take 1 tablet by mouth daily.       omega 3-dha-epa-fish oil (FISH OIL) 1,000 mg (120 mg-180 mg) cap [OMEGA 3-DHA-EPA-FISH OIL (FISH OIL) 1,000 MG (120 MG-180 MG) CAP] Take 1,000 Units by mouth daily.       soy isofla/blk cohosh/mag bark (ESTROVEN ORAL) Take 1 capsule by mouth daily       No current facility-administered medications for this visit.     Past Medical History:   Patient  has no past medical history on file.  Surgical History:  She  has a past surgical history that includes appendectomy; Cholecystectomy; and Hysterectomy.  Family and Social History:  Reviewed, and she  reports that she has been smoking. She has a 15 pack-year smoking history. She has never used smokeless tobacco. She reports that she does not drink alcohol and does not use drugs.  Reviewed, and family history includes Alzheimer Disease in her maternal grandmother, mother, and paternal grandmother; Heart Disease in her mother; Hyperlipidemia in her mother; Hypertension in her brother, mother, and sister.    RECENT DIAGNOSTIC STUDIES:    Imaging:   EXAM: MR BRAIN COW CAROTID W WO CONTRAST  LOCATION: [facility]  DATE/TIME: 2/19/2019  "1:22 PM  CONCLUSION:  HEAD MRI:   1.  Normal head MRI for age.   2.  No acute infarct, mass, or hemorrhage.  3.  Near complete opacification right maxillary sinus with mild mucosal thickening left maxillary sinus.  HEAD MRA:   1.  Normal MRA [place].  2.  No aneurysm, high flow AVM or significant stenosis identified.  3.  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 This is an anatomic variant.  NECK MRA:  1.  No significant stenosis in the neck vessels based on NASCET criteria.  2.  No evidence for dissection or pseudoaneurysm.  EXAM: CT HEAD WO CONTRAST  LOCATION: [facility]  DATE/TIME: 2/18/2019 9:09 PM  CONCLUSION:  No acute intracranial abnormality.    EEG 02/18/19  Impression: Normal voltage waking, drowsy, N1 and N2 sleep EEG recording.     REVIEW OF SYSTEMS:                                                      10-point review of systems is negative except as mentioned above in HPI.    EXAM:                                                      Physical Exam:   Vitals: BP (!) 190/113   Pulse 68   Ht 1.753 m (5' 9\")   Wt 79.4 kg (175 lb)   BMI 25.84 kg/m    BMI= Body mass index is 25.84 kg/m .  GENERAL: NAD.  HEENT: NC/AT.  PULM: Non-labored breathing.   Neurologic:  MENTAL STATUS: Alert, attentive. Speech is fluent. Normal comprehension. Normal concentration. Adequate fund of knowledge.   CRANIAL NERVES: Visual fields intact to confrontation. Pupils equally, round and reactive to light. Facial sensation and movement normal. EOM full. Hearing intact to conversation. Trapezius strength intact. Palate moves symmetrically. Tongue midline.  MOTOR: 5/5 in proximal and distal muscle groups of upper and lower extremities. Tone and bulk normal.   SENSATION: Normal light touch throughout.   STATION AND GAIT: Romberg Negative. Good postural reflexes. Casual gait      ASSESSMENT and PLAN:                                 "                      Assessment:    ICD-10-CM    1. Tonic-clonic seizures (H)  G40.409           Seble Lamb is a 59 year old female with a history of hypertension, hypothyroidism, kidney stone and seizures. She follows with Dr. Hilton in the clinic. Per chart review, patient had a single seizure on 02/18/19. She was on Keppra with increased fatigue. After receiving a normal MRI and EEG she decided to stop her AED and continue to monitor.  Patient has remained seizure free. I completed the DMV loss of consciousness form for a 3-year timeframe.  We discussed interventions, will plan to see the patient back in 12 months. Seble understands and agrees with this plan.     Plan:  --- DMV paperwork completed for 3 years  --- Plan on follow up in the Neurology Clinic in 12 months.  --- Please feel free to reach out if you have any further questions or concerns.  --- Seek immediate medical attention if an emergency arises or if your health becomes progressively worse.     It was a pleasure to see you today!     Total Time: Total time spent for face to face visit, reviewing labs/imaging studies, counseling and coordination of care was: 15 Minutes spent on the date of the encounter doing chart review, review of test results, patient visit and documentation     This note was dictated using voice recognition software.  Any grammatical or context distortions are unintentional and inherent to the software.    Mariza Lozada, RAFAELA, APRN, CNP  [facility]                       Again, thank you for allowing me to participate in the care of your patient.        Sincerely,        JAIRO Small CNP    Electronically signed

## 2025-02-12 NOTE — PATIENT INSTRUCTIONS
Plan:  --- DMV paperwork completed for 3 years  --- Plan on follow up in the Neurology Clinic in 12 months.  --- Please feel free to reach out if you have any further questions or concerns.  --- Seek immediate medical attention if an emergency arises or if your health becomes progressively worse.

## 2025-02-15 ENCOUNTER — HEALTH MAINTENANCE LETTER (OUTPATIENT)
Age: 60
End: 2025-02-15